# Patient Record
Sex: FEMALE | Race: OTHER | NOT HISPANIC OR LATINO | URBAN - METROPOLITAN AREA
[De-identification: names, ages, dates, MRNs, and addresses within clinical notes are randomized per-mention and may not be internally consistent; named-entity substitution may affect disease eponyms.]

---

## 2018-11-06 VITALS
TEMPERATURE: 98 F | HEIGHT: 62 IN | RESPIRATION RATE: 16 BRPM | SYSTOLIC BLOOD PRESSURE: 165 MMHG | OXYGEN SATURATION: 97 % | DIASTOLIC BLOOD PRESSURE: 92 MMHG | HEART RATE: 68 BPM | WEIGHT: 210.1 LBS

## 2018-11-07 ENCOUNTER — INPATIENT (INPATIENT)
Facility: HOSPITAL | Age: 78
LOS: 2 days | Discharge: NURSING, REHAB OR LONG-TERM CARE | DRG: 470 | End: 2018-11-10
Payer: MEDICARE

## 2018-11-07 DIAGNOSIS — I63.9 CEREBRAL INFARCTION, UNSPECIFIED: ICD-10-CM

## 2018-11-07 DIAGNOSIS — R05 COUGH: ICD-10-CM

## 2018-11-07 DIAGNOSIS — C50.919 MALIGNANT NEOPLASM OF UNSPECIFIED SITE OF UNSPECIFIED FEMALE BREAST: ICD-10-CM

## 2018-11-07 DIAGNOSIS — F41.9 ANXIETY DISORDER, UNSPECIFIED: ICD-10-CM

## 2018-11-07 DIAGNOSIS — E03.9 HYPOTHYROIDISM, UNSPECIFIED: ICD-10-CM

## 2018-11-07 DIAGNOSIS — D49.1 NEOPLASM OF UNSPECIFIED BEHAVIOR OF RESPIRATORY SYSTEM: ICD-10-CM

## 2018-11-07 DIAGNOSIS — Z90.11 ACQUIRED ABSENCE OF RIGHT BREAST AND NIPPLE: ICD-10-CM

## 2018-11-07 DIAGNOSIS — I69.934 MONOPLEGIA OF UPPER LIMB FOLLOWING UNSPECIFIED CEREBROVASCULAR DISEASE AFFECTING LEFT NON-DOMINANT SIDE: ICD-10-CM

## 2018-11-07 DIAGNOSIS — E78.5 HYPERLIPIDEMIA, UNSPECIFIED: ICD-10-CM

## 2018-11-07 DIAGNOSIS — M17.12 UNILATERAL PRIMARY OSTEOARTHRITIS, LEFT KNEE: ICD-10-CM

## 2018-11-07 DIAGNOSIS — Z90.2 ACQUIRED ABSENCE OF LUNG [PART OF]: ICD-10-CM

## 2018-11-07 DIAGNOSIS — I10 ESSENTIAL (PRIMARY) HYPERTENSION: ICD-10-CM

## 2018-11-07 DIAGNOSIS — Z85.118 PERSONAL HISTORY OF OTHER MALIGNANT NEOPLASM OF BRONCHUS AND LUNG: ICD-10-CM

## 2018-11-07 DIAGNOSIS — Z98.890 OTHER SPECIFIED POSTPROCEDURAL STATES: Chronic | ICD-10-CM

## 2018-11-07 DIAGNOSIS — Z87.891 PERSONAL HISTORY OF NICOTINE DEPENDENCE: ICD-10-CM

## 2018-11-07 DIAGNOSIS — R91.8 OTHER NONSPECIFIC ABNORMAL FINDING OF LUNG FIELD: ICD-10-CM

## 2018-11-07 DIAGNOSIS — E66.9 OBESITY, UNSPECIFIED: ICD-10-CM

## 2018-11-07 DIAGNOSIS — Z79.4 LONG TERM (CURRENT) USE OF INSULIN: ICD-10-CM

## 2018-11-07 DIAGNOSIS — D49.1 NEOPLASM OF UNSPECIFIED BEHAVIOR OF RESPIRATORY SYSTEM: Chronic | ICD-10-CM

## 2018-11-07 DIAGNOSIS — Z85.3 PERSONAL HISTORY OF MALIGNANT NEOPLASM OF BREAST: ICD-10-CM

## 2018-11-07 DIAGNOSIS — E11.9 TYPE 2 DIABETES MELLITUS WITHOUT COMPLICATIONS: ICD-10-CM

## 2018-11-07 DIAGNOSIS — Z90.11 ACQUIRED ABSENCE OF RIGHT BREAST AND NIPPLE: Chronic | ICD-10-CM

## 2018-11-07 DIAGNOSIS — F32.9 MAJOR DEPRESSIVE DISORDER, SINGLE EPISODE, UNSPECIFIED: ICD-10-CM

## 2018-11-07 LAB
GLUCOSE BLDC GLUCOMTR-MCNC: 141 MG/DL — HIGH (ref 70–99)
GLUCOSE BLDC GLUCOMTR-MCNC: 144 MG/DL — HIGH (ref 70–99)
GLUCOSE BLDC GLUCOMTR-MCNC: 217 MG/DL — HIGH (ref 70–99)

## 2018-11-07 PROCEDURE — 73560 X-RAY EXAM OF KNEE 1 OR 2: CPT | Mod: 26,LT

## 2018-11-07 RX ORDER — OXYCODONE HYDROCHLORIDE 5 MG/1
5 TABLET ORAL EVERY 4 HOURS
Qty: 0 | Refills: 0 | Status: DISCONTINUED | OUTPATIENT
Start: 2018-11-07 | End: 2018-11-09

## 2018-11-07 RX ORDER — LEVOTHYROXINE SODIUM 125 MCG
75 TABLET ORAL DAILY
Qty: 0 | Refills: 0 | Status: DISCONTINUED | OUTPATIENT
Start: 2018-11-07 | End: 2018-11-10

## 2018-11-07 RX ORDER — DEXTROSE 50 % IN WATER 50 %
15 SYRINGE (ML) INTRAVENOUS ONCE
Qty: 0 | Refills: 0 | Status: DISCONTINUED | OUTPATIENT
Start: 2018-11-07 | End: 2018-11-10

## 2018-11-07 RX ORDER — HYDROMORPHONE HYDROCHLORIDE 2 MG/ML
0.5 INJECTION INTRAMUSCULAR; INTRAVENOUS; SUBCUTANEOUS
Qty: 0 | Refills: 0 | Status: DISCONTINUED | OUTPATIENT
Start: 2018-11-07 | End: 2018-11-07

## 2018-11-07 RX ORDER — INSULIN LISPRO 100/ML
VIAL (ML) SUBCUTANEOUS
Qty: 0 | Refills: 0 | Status: DISCONTINUED | OUTPATIENT
Start: 2018-11-07 | End: 2018-11-10

## 2018-11-07 RX ORDER — GABAPENTIN 400 MG/1
300 CAPSULE ORAL DAILY
Qty: 0 | Refills: 0 | Status: DISCONTINUED | OUTPATIENT
Start: 2018-11-07 | End: 2018-11-10

## 2018-11-07 RX ORDER — DEXTROSE 50 % IN WATER 50 %
25 SYRINGE (ML) INTRAVENOUS ONCE
Qty: 0 | Refills: 0 | Status: DISCONTINUED | OUTPATIENT
Start: 2018-11-07 | End: 2018-11-10

## 2018-11-07 RX ORDER — MIRTAZAPINE 45 MG/1
15 TABLET, ORALLY DISINTEGRATING ORAL DAILY
Qty: 0 | Refills: 0 | Status: DISCONTINUED | OUTPATIENT
Start: 2018-11-07 | End: 2018-11-10

## 2018-11-07 RX ORDER — HYDROMORPHONE HYDROCHLORIDE 2 MG/ML
1 INJECTION INTRAMUSCULAR; INTRAVENOUS; SUBCUTANEOUS EVERY 4 HOURS
Qty: 0 | Refills: 0 | Status: DISCONTINUED | OUTPATIENT
Start: 2018-11-07 | End: 2018-11-07

## 2018-11-07 RX ORDER — ASPIRIN/CALCIUM CARB/MAGNESIUM 324 MG
325 TABLET ORAL DAILY
Qty: 0 | Refills: 0 | Status: DISCONTINUED | OUTPATIENT
Start: 2018-11-07 | End: 2018-11-07

## 2018-11-07 RX ORDER — ONDANSETRON 8 MG/1
4 TABLET, FILM COATED ORAL EVERY 6 HOURS
Qty: 0 | Refills: 0 | Status: DISCONTINUED | OUTPATIENT
Start: 2018-11-07 | End: 2018-11-07

## 2018-11-07 RX ORDER — INSULIN GLARGINE 100 [IU]/ML
6 INJECTION, SOLUTION SUBCUTANEOUS AT BEDTIME
Qty: 0 | Refills: 0 | Status: DISCONTINUED | OUTPATIENT
Start: 2018-11-07 | End: 2018-11-10

## 2018-11-07 RX ORDER — HYDROMORPHONE HYDROCHLORIDE 2 MG/ML
0.5 INJECTION INTRAMUSCULAR; INTRAVENOUS; SUBCUTANEOUS EVERY 4 HOURS
Qty: 0 | Refills: 0 | Status: DISCONTINUED | OUTPATIENT
Start: 2018-11-07 | End: 2018-11-10

## 2018-11-07 RX ORDER — INSULIN LISPRO 100/ML
VIAL (ML) SUBCUTANEOUS AT BEDTIME
Qty: 0 | Refills: 0 | Status: DISCONTINUED | OUTPATIENT
Start: 2018-11-07 | End: 2018-11-10

## 2018-11-07 RX ORDER — ATORVASTATIN CALCIUM 80 MG/1
20 TABLET, FILM COATED ORAL AT BEDTIME
Qty: 0 | Refills: 0 | Status: DISCONTINUED | OUTPATIENT
Start: 2018-11-07 | End: 2018-11-10

## 2018-11-07 RX ORDER — ONDANSETRON 8 MG/1
4 TABLET, FILM COATED ORAL EVERY 6 HOURS
Qty: 0 | Refills: 0 | Status: DISCONTINUED | OUTPATIENT
Start: 2018-11-07 | End: 2018-11-10

## 2018-11-07 RX ORDER — SODIUM CHLORIDE 9 MG/ML
1000 INJECTION, SOLUTION INTRAVENOUS
Qty: 0 | Refills: 0 | Status: DISCONTINUED | OUTPATIENT
Start: 2018-11-07 | End: 2018-11-10

## 2018-11-07 RX ORDER — CEFAZOLIN SODIUM 1 G
2000 VIAL (EA) INJECTION EVERY 8 HOURS
Qty: 0 | Refills: 0 | Status: COMPLETED | OUTPATIENT
Start: 2018-11-07 | End: 2018-11-08

## 2018-11-07 RX ORDER — ASPIRIN/CALCIUM CARB/MAGNESIUM 324 MG
325 TABLET ORAL DAILY
Qty: 0 | Refills: 0 | Status: DISCONTINUED | OUTPATIENT
Start: 2018-11-07 | End: 2018-11-08

## 2018-11-07 RX ORDER — OXYCODONE AND ACETAMINOPHEN 5; 325 MG/1; MG/1
2 TABLET ORAL EVERY 4 HOURS
Qty: 0 | Refills: 0 | Status: DISCONTINUED | OUTPATIENT
Start: 2018-11-07 | End: 2018-11-07

## 2018-11-07 RX ORDER — MONTELUKAST 4 MG/1
10 TABLET, CHEWABLE ORAL DAILY
Qty: 0 | Refills: 0 | Status: DISCONTINUED | OUTPATIENT
Start: 2018-11-07 | End: 2018-11-10

## 2018-11-07 RX ORDER — OXYCODONE HYDROCHLORIDE 5 MG/1
10 TABLET ORAL EVERY 4 HOURS
Qty: 0 | Refills: 0 | Status: DISCONTINUED | OUTPATIENT
Start: 2018-11-07 | End: 2018-11-09

## 2018-11-07 RX ORDER — HYDROMORPHONE HYDROCHLORIDE 2 MG/ML
0.5 INJECTION INTRAMUSCULAR; INTRAVENOUS; SUBCUTANEOUS ONCE
Qty: 0 | Refills: 0 | Status: DISCONTINUED | OUTPATIENT
Start: 2018-11-07 | End: 2018-11-07

## 2018-11-07 RX ORDER — DEXTROSE 50 % IN WATER 50 %
12.5 SYRINGE (ML) INTRAVENOUS ONCE
Qty: 0 | Refills: 0 | Status: DISCONTINUED | OUTPATIENT
Start: 2018-11-07 | End: 2018-11-10

## 2018-11-07 RX ORDER — INSULIN LISPRO 100/ML
2 VIAL (ML) SUBCUTANEOUS
Qty: 0 | Refills: 0 | Status: DISCONTINUED | OUTPATIENT
Start: 2018-11-07 | End: 2018-11-10

## 2018-11-07 RX ORDER — GLUCAGON INJECTION, SOLUTION 0.5 MG/.1ML
1 INJECTION, SOLUTION SUBCUTANEOUS ONCE
Qty: 0 | Refills: 0 | Status: DISCONTINUED | OUTPATIENT
Start: 2018-11-07 | End: 2018-11-10

## 2018-11-07 RX ORDER — OXYCODONE AND ACETAMINOPHEN 5; 325 MG/1; MG/1
1 TABLET ORAL EVERY 4 HOURS
Qty: 0 | Refills: 0 | Status: DISCONTINUED | OUTPATIENT
Start: 2018-11-07 | End: 2018-11-07

## 2018-11-07 RX ORDER — ACETAMINOPHEN 500 MG
650 TABLET ORAL EVERY 6 HOURS
Qty: 0 | Refills: 0 | Status: DISCONTINUED | OUTPATIENT
Start: 2018-11-07 | End: 2018-11-08

## 2018-11-07 RX ORDER — BUPIVACAINE 13.3 MG/ML
20 INJECTION, SUSPENSION, LIPOSOMAL INFILTRATION ONCE
Qty: 0 | Refills: 0 | Status: DISCONTINUED | OUTPATIENT
Start: 2018-11-07 | End: 2018-11-10

## 2018-11-07 RX ORDER — LOSARTAN POTASSIUM 100 MG/1
50 TABLET, FILM COATED ORAL DAILY
Qty: 0 | Refills: 0 | Status: DISCONTINUED | OUTPATIENT
Start: 2018-11-07 | End: 2018-11-08

## 2018-11-07 RX ADMIN — HYDROMORPHONE HYDROCHLORIDE 0.5 MILLIGRAM(S): 2 INJECTION INTRAMUSCULAR; INTRAVENOUS; SUBCUTANEOUS at 15:57

## 2018-11-07 RX ADMIN — HYDROMORPHONE HYDROCHLORIDE 0.5 MILLIGRAM(S): 2 INJECTION INTRAMUSCULAR; INTRAVENOUS; SUBCUTANEOUS at 16:40

## 2018-11-07 RX ADMIN — HYDROMORPHONE HYDROCHLORIDE 0.5 MILLIGRAM(S): 2 INJECTION INTRAMUSCULAR; INTRAVENOUS; SUBCUTANEOUS at 15:28

## 2018-11-07 RX ADMIN — Medication 100 MILLIGRAM(S): at 18:02

## 2018-11-07 RX ADMIN — INSULIN GLARGINE 6 UNIT(S): 100 INJECTION, SOLUTION SUBCUTANEOUS at 22:20

## 2018-11-07 RX ADMIN — ATORVASTATIN CALCIUM 20 MILLIGRAM(S): 80 TABLET, FILM COATED ORAL at 22:03

## 2018-11-07 NOTE — PHYSICAL THERAPY INITIAL EVALUATION ADULT - GAIT DEVIATIONS NOTED, PT EVAL
decreased gait speed, unsteady, difficulty weight-shifting/decreased stride length/decreased step length

## 2018-11-07 NOTE — H&P ADULT - HISTORY OF PRESENT ILLNESS
77F c/o left knee pain x > 1 year. Pt denies preceding trauma/injury. Pt states her left knee pain is localized; she takes mobic as needed for pain however has failed conservative treatment for her symptoms. Pt denies numbness/tingling of bilateral lower extremities; endorses left lower extremity weakness secondary to knee instability. Pt denies DVT hx; takes ASA 81 which she d/c 1 week ago preoperatively. Denies CP, SOB, N/V, tactile fevers today.

## 2018-11-07 NOTE — PROGRESS NOTE ADULT - SUBJECTIVE AND OBJECTIVE BOX
Orthopaedics Post Op Check    Procedure: L TKR  Surgeon: Lars Sandoval comfortable, without complaints  Denies CP, SOB, N/V, numbness/tingling     Vital Signs Last 24 Hrs  T(C): 36.9 (07 Nov 2018 22:00), Max: 36.9 (07 Nov 2018 22:00)  T(F): 98.5 (07 Nov 2018 22:00), Max: 98.5 (07 Nov 2018 22:00)  HR: 84 (07 Nov 2018 22:00) (58 - 86)  BP: 142/66 (07 Nov 2018 22:00) (132/58 - 178/71)  BP(mean): 99 (07 Nov 2018 16:35) (87 - 107)  RR: 17 (07 Nov 2018 22:00) (11 - 18)  SpO2: 95% (07 Nov 2018 22:00) (94% - 100%)      AVSS, NAD  General: Pt Alert and oriented       LLE  Dressing: C/D/I  Motor: 5/5 GS/TA/EHL/FHL    Sensation: SILT s/sp/dp/tib  Pulses:  DP/PT 2+ , toes/foot WWP          Post op XR: Well positioned components    A/P: 77yFemale POD#0 s/p above procedure, doing well  - Stable  - Pain Control  - DVT ppx:  BID / SCDs  - Melisa op abx: ancef  - PT, WBS: WBAT  - F/U AM Labs

## 2018-11-07 NOTE — H&P ADULT - PMH
Anxiety and depression    Breast cancer    DM (diabetes mellitus)    HLD (hyperlipidemia)    HTN (hypertension)    Hypothyroidism    Lung tumor    Stroke

## 2018-11-07 NOTE — H&P ADULT - NSHPLABSRESULTS_GEN_ALL_CORE
Preop CBC, BMP, PT/PTT/INR, UA - WNL per medical clearance  Nasal swab negative  Preop EKG - WNL per medical clearance  Preop CXR - WNL per medical clearance

## 2018-11-07 NOTE — PHYSICAL THERAPY INITIAL EVALUATION ADULT - PERTINENT HX OF CURRENT PROBLEM, REHAB EVAL
77F c/o left knee pain x > 1 year. Pt denies preceding trauma/injury. Pt states her left knee pain is localized; she takes mobic as needed for pain however has failed conservative treatment for her symptoms. Pt denies numbness/tingling of bilateral lower extremities; endorses left lower extremity weakness secondary to knee instability.

## 2018-11-07 NOTE — PHYSICAL THERAPY INITIAL EVALUATION ADULT - ADDITIONAL COMMENTS
Patient lives in house, 5 steps to enter, chair lift for stairs on inside of home. Patient reports home health assistance 5 days a week for 2 hours. Patient is R handed, has no use of LUE secondary to prior CVA. Patient denies history of falls. Patient owns grab bars bathroom and shower chair.

## 2018-11-07 NOTE — H&P ADULT - PROBLEM SELECTOR PLAN 1
Admit to Orthopaedic Service.  Presents today for elective left TKR   Pt medically stable and cleared for procedure today by Dr. Mahoney

## 2018-11-07 NOTE — H&P ADULT - NSHPPHYSICALEXAM_GEN_ALL_CORE
MSK: + decreased ROM secondary to pain, left knee  Skin warm and well perfused; no visible wounds/erythema/ecchymoses  EHL/FHL/TA/GS 5/5 motor strength bilaterally   SLT in tact and equal to distal bilateral lower extremities  DP/PT pulses 2+     Remainder of exam per medical clearance note

## 2018-11-07 NOTE — PHYSICAL THERAPY INITIAL EVALUATION ADULT - ACTIVE RANGE OF MOTION EXAMINATION, REHAB EVAL
bilateral upper extremity Active ROM was WFL (within functional limits)/bilateral  lower extremity Active ROM was WFL (within functional limits) bilateral  lower extremity Active ROM was WFL (within functional limits)/deficits as listed below/except at L knee flexion/extension/bilateral upper extremity Active ROM was WFL (within functional limits)

## 2018-11-08 LAB
ANION GAP SERPL CALC-SCNC: 15 MMOL/L — SIGNIFICANT CHANGE UP (ref 5–17)
BASOPHILS NFR BLD AUTO: 0.2 % — SIGNIFICANT CHANGE UP (ref 0–2)
BUN SERPL-MCNC: 20 MG/DL — SIGNIFICANT CHANGE UP (ref 7–23)
CALCIUM SERPL-MCNC: 9.6 MG/DL — SIGNIFICANT CHANGE UP (ref 8.4–10.5)
CHLORIDE SERPL-SCNC: 96 MMOL/L — SIGNIFICANT CHANGE UP (ref 96–108)
CO2 SERPL-SCNC: 26 MMOL/L — SIGNIFICANT CHANGE UP (ref 22–31)
CREAT SERPL-MCNC: 0.82 MG/DL — SIGNIFICANT CHANGE UP (ref 0.5–1.3)
GLUCOSE BLDC GLUCOMTR-MCNC: 161 MG/DL — HIGH (ref 70–99)
GLUCOSE BLDC GLUCOMTR-MCNC: 164 MG/DL — HIGH (ref 70–99)
GLUCOSE BLDC GLUCOMTR-MCNC: 234 MG/DL — HIGH (ref 70–99)
GLUCOSE BLDC GLUCOMTR-MCNC: 270 MG/DL — HIGH (ref 70–99)
GLUCOSE SERPL-MCNC: 254 MG/DL — HIGH (ref 70–99)
HCT VFR BLD CALC: 30.4 % — LOW (ref 34.5–45)
HGB BLD-MCNC: 9.5 G/DL — LOW (ref 11.5–15.5)
LYMPHOCYTES # BLD AUTO: 4.7 % — LOW (ref 13–44)
MCHC RBC-ENTMCNC: 28.8 PG — SIGNIFICANT CHANGE UP (ref 27–34)
MCHC RBC-ENTMCNC: 31.3 G/DL — LOW (ref 32–36)
MCV RBC AUTO: 92.1 FL — SIGNIFICANT CHANGE UP (ref 80–100)
MONOCYTES NFR BLD AUTO: 14.1 % — HIGH (ref 2–14)
NEUTROPHILS NFR BLD AUTO: 81 % — HIGH (ref 43–77)
PLATELET # BLD AUTO: 188 K/UL — SIGNIFICANT CHANGE UP (ref 150–400)
POTASSIUM SERPL-MCNC: 4.2 MMOL/L — SIGNIFICANT CHANGE UP (ref 3.5–5.3)
POTASSIUM SERPL-SCNC: 4.2 MMOL/L — SIGNIFICANT CHANGE UP (ref 3.5–5.3)
RBC # BLD: 3.3 M/UL — LOW (ref 3.8–5.2)
RBC # FLD: 14.5 % — SIGNIFICANT CHANGE UP (ref 10.3–16.9)
SODIUM SERPL-SCNC: 137 MMOL/L — SIGNIFICANT CHANGE UP (ref 135–145)
SURGICAL PATHOLOGY STUDY: SIGNIFICANT CHANGE UP
WBC # BLD: 16.9 K/UL — HIGH (ref 3.8–10.5)
WBC # FLD AUTO: 16.9 K/UL — HIGH (ref 3.8–10.5)

## 2018-11-08 RX ORDER — SENNA PLUS 8.6 MG/1
2 TABLET ORAL AT BEDTIME
Qty: 0 | Refills: 0 | Status: DISCONTINUED | OUTPATIENT
Start: 2018-11-08 | End: 2018-11-10

## 2018-11-08 RX ORDER — HALOPERIDOL DECANOATE 100 MG/ML
0.5 INJECTION INTRAMUSCULAR EVERY 8 HOURS
Qty: 0 | Refills: 0 | Status: DISCONTINUED | OUTPATIENT
Start: 2018-11-08 | End: 2018-11-10

## 2018-11-08 RX ORDER — DOCUSATE SODIUM 100 MG
100 CAPSULE ORAL DAILY
Qty: 0 | Refills: 0 | Status: DISCONTINUED | OUTPATIENT
Start: 2018-11-08 | End: 2018-11-10

## 2018-11-08 RX ORDER — LOSARTAN POTASSIUM 100 MG/1
50 TABLET, FILM COATED ORAL
Qty: 0 | Refills: 0 | Status: DISCONTINUED | OUTPATIENT
Start: 2018-11-08 | End: 2018-11-10

## 2018-11-08 RX ORDER — ACETAMINOPHEN 500 MG
650 TABLET ORAL EVERY 6 HOURS
Qty: 0 | Refills: 0 | Status: DISCONTINUED | OUTPATIENT
Start: 2018-11-08 | End: 2018-11-10

## 2018-11-08 RX ORDER — ASPIRIN/CALCIUM CARB/MAGNESIUM 324 MG
325 TABLET ORAL
Qty: 0 | Refills: 0 | Status: DISCONTINUED | OUTPATIENT
Start: 2018-11-08 | End: 2018-11-10

## 2018-11-08 RX ADMIN — LOSARTAN POTASSIUM 50 MILLIGRAM(S): 100 TABLET, FILM COATED ORAL at 22:52

## 2018-11-08 RX ADMIN — INSULIN GLARGINE 6 UNIT(S): 100 INJECTION, SOLUTION SUBCUTANEOUS at 22:45

## 2018-11-08 RX ADMIN — Medication 2 UNIT(S): at 16:57

## 2018-11-08 RX ADMIN — Medication 650 MILLIGRAM(S): at 17:00

## 2018-11-08 RX ADMIN — Medication 4: at 07:58

## 2018-11-08 RX ADMIN — Medication 2 UNIT(S): at 12:01

## 2018-11-08 RX ADMIN — HYDROMORPHONE HYDROCHLORIDE 0.5 MILLIGRAM(S): 2 INJECTION INTRAMUSCULAR; INTRAVENOUS; SUBCUTANEOUS at 06:00

## 2018-11-08 RX ADMIN — Medication 325 MILLIGRAM(S): at 12:01

## 2018-11-08 RX ADMIN — Medication 2: at 16:57

## 2018-11-08 RX ADMIN — Medication 325 MILLIGRAM(S): at 22:44

## 2018-11-08 RX ADMIN — MONTELUKAST 10 MILLIGRAM(S): 4 TABLET, CHEWABLE ORAL at 22:52

## 2018-11-08 RX ADMIN — ATORVASTATIN CALCIUM 20 MILLIGRAM(S): 80 TABLET, FILM COATED ORAL at 22:44

## 2018-11-08 RX ADMIN — HYDROMORPHONE HYDROCHLORIDE 0.5 MILLIGRAM(S): 2 INJECTION INTRAMUSCULAR; INTRAVENOUS; SUBCUTANEOUS at 05:45

## 2018-11-08 RX ADMIN — Medication 650 MILLIGRAM(S): at 12:02

## 2018-11-08 RX ADMIN — Medication 75 MICROGRAM(S): at 05:53

## 2018-11-08 RX ADMIN — Medication 6: at 12:01

## 2018-11-08 RX ADMIN — Medication 100 MILLIGRAM(S): at 02:18

## 2018-11-08 RX ADMIN — Medication 2 UNIT(S): at 07:58

## 2018-11-08 RX ADMIN — GABAPENTIN 300 MILLIGRAM(S): 400 CAPSULE ORAL at 22:44

## 2018-11-08 RX ADMIN — LOSARTAN POTASSIUM 50 MILLIGRAM(S): 100 TABLET, FILM COATED ORAL at 05:53

## 2018-11-08 RX ADMIN — OXYCODONE HYDROCHLORIDE 10 MILLIGRAM(S): 5 TABLET ORAL at 07:00

## 2018-11-08 RX ADMIN — Medication 650 MILLIGRAM(S): at 13:03

## 2018-11-08 RX ADMIN — MIRTAZAPINE 15 MILLIGRAM(S): 45 TABLET, ORALLY DISINTEGRATING ORAL at 12:01

## 2018-11-08 RX ADMIN — Medication 650 MILLIGRAM(S): at 23:53

## 2018-11-08 RX ADMIN — OXYCODONE HYDROCHLORIDE 10 MILLIGRAM(S): 5 TABLET ORAL at 06:09

## 2018-11-08 RX ADMIN — Medication 650 MILLIGRAM(S): at 17:58

## 2018-11-08 RX ADMIN — Medication 650 MILLIGRAM(S): at 23:00

## 2018-11-08 NOTE — PROGRESS NOTE ADULT - SUBJECTIVE AND OBJECTIVE BOX
Orthopaedic Surgery Progress Note     [x] Pt seen/examined.  [x] Pt without any complaints/in NAD. No acute events overnight. This morning nursing states patient was confused; A&O x 2 (self and time) which improved to A&Ox3. Pt evaluated, mildly agitated/pulling at gown however responsive to team/instruction. Per nursing patient had been giving narcotics approximately one hour prior. Narcotics held, pt evaluated by Dr. Dave who recommended haldol PRN. Pt denies CP, SOB, N/V, tactile fevers, calf pain, headache. Patient is afebrile this morning. Of note patient has hx of stroke in 2015 with residual left upper extremity weakness.     Vital Signs Last 24 Hrs  T(C): 36.7 (08 Nov 2018 08:50), Max: 37.3 (08 Nov 2018 02:10)  T(F): 98 (08 Nov 2018 08:50), Max: 99.1 (08 Nov 2018 02:10)  HR: 98 (08 Nov 2018 08:50) (58 - 98)  BP: 176/74 (08 Nov 2018 08:50) (142/66 - 178/71)  BP(mean): 99 (07 Nov 2018 16:35) (92 - 107)  RR: 16 (08 Nov 2018 08:50) (11 - 18)  SpO2: 98% (08 Nov 2018 08:50) (94% - 100%)    CAPILLARY BLOOD GLUCOSE      POCT Blood Glucose.: 270 mg/dL (08 Nov 2018 11:33)  POCT Blood Glucose.: 234 mg/dL (08 Nov 2018 07:07)  POCT Blood Glucose.: 217 mg/dL (07 Nov 2018 21:38)  POCT Blood Glucose.: 141 mg/dL (07 Nov 2018 17:53)                Physical Exam:  Patient laying in bed, NAD - mild agitation but cooperative  Skin warm and well perfused; no visible wounds/erythema/ecchymoses  Dressing C/D/I  EHL/FHL/TA/GS 5/5 motor strength bilateral lower extremities  SLT in tact and equal to distal bilateral lower extremities  DP/PT pulses 2+    LABS                        9.5    16.9  )-----------( 188      ( 08 Nov 2018 06:00 )             30.4                                11-08    137  |  96  |  20  ----------------------------<  254<H>  4.2   |  26  |  0.82    Ca    9.6      08 Nov 2018 06:00      ASSESSMENT/PLAN:  77F POD #1    STATUS POST: left total knee replacement    CONTINUE:          [x] PT - WBAT    [x] DVT PPX-  bid    [x] Pain Mgt - Narcotics held, standing Tylenol; Haldol PRN for agitation    [x] OT consult ordered (chronic LUE weakness)     [x] Dispo plan- pending PT eval

## 2018-11-08 NOTE — PROGRESS NOTE ADULT - SUBJECTIVE AND OBJECTIVE BOX
SUBJECTIVE: Patient seen and examined. Pain controlled.  Pt did well o/n  No f/c/n/v/cp/sob.     OBJECTIVE:  NAD  Vital Signs Last 24 Hrs  T(C): 36.9 (08 Nov 2018 05:40), Max: 37.3 (08 Nov 2018 02:10)  T(F): 98.5 (08 Nov 2018 05:40), Max: 99.1 (08 Nov 2018 02:10)  HR: 92 (08 Nov 2018 05:40) (58 - 98)  BP: 157/75 (08 Nov 2018 05:40) (132/58 - 178/71)  BP(mean): 99 (07 Nov 2018 16:35) (87 - 107)  RR: 18 (08 Nov 2018 05:40) (11 - 18)  SpO2: 95% (08 Nov 2018 05:40) (94% - 100%)    Affected extremity:          Dressing: clean/dry/intact, ACE bandage in place. Cryocuff           Sensation: SILT saph/sural/sp/dp/t         Motor exam: 5/5 TA/GS/EHL         warm well perfused; capillary refill <3 seconds, DP2+                        9.5    16.9  )-----------( 188      ( 08 Nov 2018 06:00 )             30.4     11-08    137  |  96  |  20  ----------------------------<  254<H>  4.2   |  26  |  0.82    Ca    9.6      08 Nov 2018 06:00      A/P :  Pt is a 76yo Female s/p  L TKA  -    Pain control  -    DVT ppx: ASA  -    Weight bearing status: WBAT   -    Physical Therapy  -    Dispo: pending

## 2018-11-09 DIAGNOSIS — R53.83 OTHER FATIGUE: ICD-10-CM

## 2018-11-09 DIAGNOSIS — Z98.890 OTHER SPECIFIED POSTPROCEDURAL STATES: ICD-10-CM

## 2018-11-09 LAB
GLUCOSE BLDC GLUCOMTR-MCNC: 145 MG/DL — HIGH (ref 70–99)
GLUCOSE BLDC GLUCOMTR-MCNC: 160 MG/DL — HIGH (ref 70–99)
GLUCOSE BLDC GLUCOMTR-MCNC: 173 MG/DL — HIGH (ref 70–99)
GLUCOSE BLDC GLUCOMTR-MCNC: 176 MG/DL — HIGH (ref 70–99)
GLUCOSE BLDC GLUCOMTR-MCNC: 231 MG/DL — HIGH (ref 70–99)

## 2018-11-09 PROCEDURE — 99232 SBSQ HOSP IP/OBS MODERATE 35: CPT | Mod: GC

## 2018-11-09 RX ORDER — CELECOXIB 200 MG/1
1 CAPSULE ORAL
Qty: 0 | Refills: 0 | COMMUNITY
Start: 2018-11-09

## 2018-11-09 RX ORDER — ASPIRIN/CALCIUM CARB/MAGNESIUM 324 MG
1 TABLET ORAL
Qty: 0 | Refills: 0 | COMMUNITY
Start: 2018-11-09

## 2018-11-09 RX ORDER — SENNA PLUS 8.6 MG/1
2 TABLET ORAL
Qty: 0 | Refills: 0 | COMMUNITY
Start: 2018-11-09

## 2018-11-09 RX ORDER — ACETAMINOPHEN 500 MG
2 TABLET ORAL
Qty: 0 | Refills: 0 | COMMUNITY
Start: 2018-11-09

## 2018-11-09 RX ORDER — CELECOXIB 200 MG/1
200 CAPSULE ORAL
Qty: 0 | Refills: 0 | Status: DISCONTINUED | OUTPATIENT
Start: 2018-11-09 | End: 2018-11-10

## 2018-11-09 RX ORDER — ASPIRIN/CALCIUM CARB/MAGNESIUM 324 MG
1 TABLET ORAL
Qty: 0 | Refills: 0 | COMMUNITY

## 2018-11-09 RX ORDER — DOCUSATE SODIUM 100 MG
1 CAPSULE ORAL
Qty: 0 | Refills: 0 | COMMUNITY
Start: 2018-11-09

## 2018-11-09 RX ORDER — OXYCODONE HYDROCHLORIDE 5 MG/1
1 TABLET ORAL
Qty: 0 | Refills: 0 | COMMUNITY
Start: 2018-11-09

## 2018-11-09 RX ADMIN — GABAPENTIN 300 MILLIGRAM(S): 400 CAPSULE ORAL at 22:34

## 2018-11-09 RX ADMIN — LOSARTAN POTASSIUM 50 MILLIGRAM(S): 100 TABLET, FILM COATED ORAL at 17:24

## 2018-11-09 RX ADMIN — Medication 2: at 06:51

## 2018-11-09 RX ADMIN — MONTELUKAST 10 MILLIGRAM(S): 4 TABLET, CHEWABLE ORAL at 22:35

## 2018-11-09 RX ADMIN — Medication 2 UNIT(S): at 13:02

## 2018-11-09 RX ADMIN — Medication 650 MILLIGRAM(S): at 13:30

## 2018-11-09 RX ADMIN — MIRTAZAPINE 15 MILLIGRAM(S): 45 TABLET, ORALLY DISINTEGRATING ORAL at 13:04

## 2018-11-09 RX ADMIN — Medication 650 MILLIGRAM(S): at 17:24

## 2018-11-09 RX ADMIN — LOSARTAN POTASSIUM 50 MILLIGRAM(S): 100 TABLET, FILM COATED ORAL at 06:51

## 2018-11-09 RX ADMIN — Medication 100 MILLIGRAM(S): at 13:02

## 2018-11-09 RX ADMIN — Medication 650 MILLIGRAM(S): at 17:41

## 2018-11-09 RX ADMIN — Medication 4: at 17:10

## 2018-11-09 RX ADMIN — Medication 650 MILLIGRAM(S): at 05:06

## 2018-11-09 RX ADMIN — CELECOXIB 200 MILLIGRAM(S): 200 CAPSULE ORAL at 17:24

## 2018-11-09 RX ADMIN — Medication 75 MICROGRAM(S): at 06:51

## 2018-11-09 RX ADMIN — Medication 650 MILLIGRAM(S): at 23:30

## 2018-11-09 RX ADMIN — Medication 325 MILLIGRAM(S): at 22:34

## 2018-11-09 RX ADMIN — Medication 650 MILLIGRAM(S): at 05:00

## 2018-11-09 RX ADMIN — Medication 650 MILLIGRAM(S): at 22:34

## 2018-11-09 RX ADMIN — ATORVASTATIN CALCIUM 20 MILLIGRAM(S): 80 TABLET, FILM COATED ORAL at 22:34

## 2018-11-09 RX ADMIN — Medication 650 MILLIGRAM(S): at 13:01

## 2018-11-09 RX ADMIN — INSULIN GLARGINE 6 UNIT(S): 100 INJECTION, SOLUTION SUBCUTANEOUS at 22:33

## 2018-11-09 RX ADMIN — Medication 325 MILLIGRAM(S): at 13:05

## 2018-11-09 RX ADMIN — Medication 2 UNIT(S): at 17:10

## 2018-11-09 RX ADMIN — Medication 2 UNIT(S): at 06:51

## 2018-11-09 RX ADMIN — CELECOXIB 200 MILLIGRAM(S): 200 CAPSULE ORAL at 17:41

## 2018-11-09 NOTE — PROGRESS NOTE ADULT - PROBLEM SELECTOR PLAN 1
most likely related to narcotics.   The mental statis improved and she is answering questions appropriately

## 2018-11-09 NOTE — DISCHARGE NOTE ADULT - CARE PLAN
Principal Discharge DX:	Primary osteoarthritis of left knee  Goal:	improvement s/p Left TKR 11/7/18  Assessment and plan of treatment:	No strenuous activity, heavy lifting, driving or returning to work until cleared by MD.  You may shower - dressing is water-resistant, no soaking in bathtubs.  Dressing to be removed after post op day 5-7, then can leave incision open to air. Keep incision clean and dry.  Any staples/sutures should be removed in 10-14 days.  Try to have regular bowel movements, take stool softener or laxative if necessary.  May take Pepcid or Zantac for upset stomach.  May take Aleve or Naproxen.  Swelling may travel all the way down leg to foot, this is normal and will subside in a few weeks.  Call to schedule an appt with Dr. Sousa for follow up after discharge, usually 2-3 weeks postop.  Contact doctor if you experience: fever greater than 101.5, chills, chest pain, difficulty breathing, redness or excessive drainage around the incision, other concerns.  Follow up with primary care provider. Principal Discharge DX:	Primary osteoarthritis of left knee  Goal:	improvement s/p Left TKR 11/7/18  Assessment and plan of treatment:	No strenuous activity, heavy lifting, driving or returning to work until cleared by MD.  You may shower - dressing is water-resistant, no soaking in bathtubs.  Dressing to be removed after post op day 5-7, then can leave incision open to air. Keep incision clean and dry.  Any staples/sutures should be removed in 10-14 days.  Try to have regular bowel movements, take stool softener or laxative if necessary.  May take Pepcid or Zantac for upset stomach.  May take Aleve or Naproxen instead of celebrex.  Swelling may travel all the way down leg to foot, this is normal and will subside in a few weeks.  Call to schedule an appt with Dr. Sousa for follow up after discharge, usually 2-3 weeks postop.  Contact doctor if you experience: fever greater than 101.5, chills, chest pain, difficulty breathing, redness or excessive drainage around the incision, other concerns.  Follow up with primary care provider.

## 2018-11-09 NOTE — DISCHARGE NOTE ADULT - PATIENT PORTAL LINK FT
You can access the [facility] Patient Portal, offered by [facility], by registering with the following website: [email or web address]

## 2018-11-09 NOTE — DISCHARGE NOTE ADULT - MEDICATION SUMMARY - MEDICATIONS TO TAKE
I will START or STAY ON the medications listed below when I get home from the hospital:    aspirin 325 mg oral delayed release tablet  -- 1 tab(s) by mouth 2 times a day for 4 weeks postop  -- Indication: For Dvt ppx    oxyCODONE 5 mg oral tablet  -- 1 tab(s) by mouth every 4 hours, As needed, Moderate Pain (4 - 6)  -- Indication: For Pain    oxyCODONE 10 mg oral tablet  -- 1 tab(s) by mouth every 4 hours, As needed, Severe Pain (7 - 10)  -- Indication: For Pain    losartan 50 mg oral tablet  -- 1 tab(s) by mouth 2 times a day  -- Indication: For HTN (hypertension)    gabapentin enacarbil 300 mg oral tablet, extended release  -- orally once a day  -- Indication: For nerve pain    Remeron  -- Indication: For Psych med    Lantus Solostar Pen 100 units/mL subcutaneous solution  -- 12 unit(s) subcutaneous once a day (at bedtime)  -- Indication: For DM (diabetes mellitus)    NovoLOG 100 units/mL subcutaneous solution  -- 4 unit(s) subcutaneous 4 times a day  -- Indication: For DM (diabetes mellitus)    metFORMIN 750 mg oral tablet, extended release  -- 1 tab(s) by mouth 2 times a day  -- Indication: For DM (diabetes mellitus)    atorvastatin 20 mg oral tablet  -- 1 tab(s) by mouth once a day  -- Indication: For Hyperlipidemia    docusate sodium 100 mg oral capsule  -- 1 cap(s) by mouth once a day  -- Indication: For constipation    senna oral tablet  -- 2 tab(s) by mouth once a day (at bedtime), As needed, Constipation  -- Indication: For constipation    Singulair 10 mg oral tablet  -- 1 tab(s) by mouth once a day  -- Indication: For Asthma    Synthroid 75 mcg (0.075 mg) oral tablet  -- 1 tab(s) by mouth once a day  -- Indication: For Hypothyroidism I will START or STAY ON the medications listed below when I get home from the hospital:    aspirin 325 mg oral delayed release tablet  -- 1 tab(s) by mouth 2 times a day for 4 weeks postop  -- Indication: For Dvt ppx    Tylenol 325 mg oral tablet  -- 2 tab(s) by mouth every 6 hours, As Needed  -- Indication: For Pain    CeleBREX 200 mg oral capsule  -- 1 cap(s) by mouth 2 times a day  -- Indication: For Anti-inflammatory/pain    losartan 50 mg oral tablet  -- 1 tab(s) by mouth 2 times a day  -- Indication: For HTN (hypertension)    gabapentin enacarbil 300 mg oral tablet, extended release  -- orally once a day  -- Indication: For nerve pain    Remeron  -- Indication: For Psych med    Lantus Solostar Pen 100 units/mL subcutaneous solution  -- 12 unit(s) subcutaneous once a day (at bedtime)  -- Indication: For DM (diabetes mellitus)    NovoLOG 100 units/mL subcutaneous solution  -- 4 unit(s) subcutaneous 4 times a day  -- Indication: For DM (diabetes mellitus)    metFORMIN 750 mg oral tablet, extended release  -- 1 tab(s) by mouth 2 times a day  -- Indication: For DM (diabetes mellitus)    atorvastatin 20 mg oral tablet  -- 1 tab(s) by mouth once a day  -- Indication: For Hyperlipidemia    docusate sodium 100 mg oral capsule  -- 1 cap(s) by mouth once a day  -- Indication: For constipation    senna oral tablet  -- 2 tab(s) by mouth once a day (at bedtime), As needed, Constipation  -- Indication: For constipation    Singulair 10 mg oral tablet  -- 1 tab(s) by mouth once a day  -- Indication: For Asthma    Synthroid 75 mcg (0.075 mg) oral tablet  -- 1 tab(s) by mouth once a day  -- Indication: For Hypothyroidism

## 2018-11-09 NOTE — PROGRESS NOTE ADULT - ATTENDING COMMENTS
Patient seen and examined with house-staff during bedside rounds.  Resident note read, including vitals, physical findings, laboratory data, and radiological reports.   Revisions included below.  Direct personal management at bed side and extensive interpretation of the data.  Plan was outlined and discussed in details with the housestaff.  Decision making of high complexity  Action taken for acute disease activity to reflect the level of care provided:  - medication reconciliation  - review laboratory data  haldol as needed

## 2018-11-09 NOTE — DISCHARGE NOTE ADULT - CARE PROVIDER_API CALL
Holger Sousa), Orthopaedic Surgery  [address]  [address] [place] [address]  Phone: [phone]  Fax: [phone]

## 2018-11-09 NOTE — DISCHARGE NOTE ADULT - PLAN OF CARE
improvement s/p Left TKR 11/7/18 No strenuous activity, heavy lifting, driving or returning to work until cleared by MD.  You may shower - dressing is water-resistant, no soaking in bathtubs.  Dressing to be removed after post op day 5-7, then can leave incision open to air. Keep incision clean and dry.  Any staples/sutures should be removed in 10-14 days.  Try to have regular bowel movements, take stool softener or laxative if necessary.  May take Pepcid or Zantac for upset stomach.  May take Aleve or Naproxen.  Swelling may travel all the way down leg to foot, this is normal and will subside in a few weeks.  Call to schedule an appt with Dr. Sousa for follow up after discharge, usually 2-3 weeks postop.  Contact doctor if you experience: fever greater than 101.5, chills, chest pain, difficulty breathing, redness or excessive drainage around the incision, other concerns.  Follow up with primary care provider. No strenuous activity, heavy lifting, driving or returning to work until cleared by MD.  You may shower - dressing is water-resistant, no soaking in bathtubs.  Dressing to be removed after post op day 5-7, then can leave incision open to air. Keep incision clean and dry.  Any staples/sutures should be removed in 10-14 days.  Try to have regular bowel movements, take stool softener or laxative if necessary.  May take Pepcid or Zantac for upset stomach.  May take Aleve or Naproxen instead of celebrex.  Swelling may travel all the way down leg to foot, this is normal and will subside in a few weeks.  Call to schedule an appt with Dr. Sousa for follow up after discharge, usually 2-3 weeks postop.  Contact doctor if you experience: fever greater than 101.5, chills, chest pain, difficulty breathing, redness or excessive drainage around the incision, other concerns.  Follow up with primary care provider.

## 2018-11-09 NOTE — PROGRESS NOTE ADULT - SUBJECTIVE AND OBJECTIVE BOX
Interval Events: Reviewed  Patient seen and examined at bedside.    Patient is a 77y old  Female who presents with a chief complaint of left knee pain (09 Nov 2018 11:48)    she is better and eating.  She did not get oob  PAST MEDICAL & SURGICAL HISTORY:  Restless leg syndrome  Lung tumor  Stroke  HLD (hyperlipidemia)  HTN (hypertension)  Hypothyroidism  DM (diabetes mellitus)  Anxiety and depression  Breast cancer  Lung tumor: left lung rescetion doesnot know what part.  Lung tumor: right upper lobe  H/O arthroscopy of right knee: 1998  H/O mastectomy, right: 1991      MEDICATIONS:  Pulmonary:  montelukast 10 milliGRAM(s) Oral daily    Antimicrobials:    Anticoagulants:  aspirin enteric coated 325 milliGRAM(s) Oral two times a day    Cardiac:  losartan 50 milliGRAM(s) Oral two times a day      Allergies    No Known Allergies    Intolerances        Vital Signs Last 24 Hrs  T(C): 37.8 (09 Nov 2018 15:29), Max: 37.8 (09 Nov 2018 15:29)  T(F): 100 (09 Nov 2018 15:29), Max: 100 (09 Nov 2018 15:29)  HR: 85 (09 Nov 2018 15:29) (85 - 90)  BP: 122/50 (09 Nov 2018 15:29) (122/50 - 174/72)  BP(mean): --  RR: 15 (09 Nov 2018 15:29) (14 - 16)  SpO2: 95% (09 Nov 2018 15:29) (95% - 98%)    11-08 @ 07:01  -  11-09 @ 07:00  --------------------------------------------------------  IN: 200 mL / OUT: 1600 mL / NET: -1400 mL          LABS:      CBC Full  -  ( 08 Nov 2018 06:00 )  WBC Count : 16.9 K/uL  Hemoglobin : 9.5 g/dL  Hematocrit : 30.4 %  Platelet Count - Automated : 188 K/uL  Mean Cell Volume : 92.1 fL  Mean Cell Hemoglobin : 28.8 pg  Mean Cell Hemoglobin Concentration : 31.3 g/dL  Auto Neutrophil # : x  Auto Lymphocyte # : x  Auto Monocyte # : x  Auto Eosinophil # : x  Auto Basophil # : x  Auto Neutrophil % : 81.0 %  Auto Lymphocyte % : 4.7 %  Auto Monocyte % : 14.1 %  Auto Eosinophil % : x  Auto Basophil % : 0.2 %    11-08    137  |  96  |  20  ----------------------------<  254<H>  4.2   |  26  |  0.82    Ca    9.6      08 Nov 2018 06:00                          RADIOLOGY & ADDITIONAL STUDIES (The following images were personally reviewed):  Garibay:                                     No  Urine output:                       adequate  DVT prophylaxis:                 Yes  Flattus:                                  Yes  Bowel movement:              No

## 2018-11-09 NOTE — DISCHARGE NOTE ADULT - NS AS ACTIVITY OBS
Stairs allowed/No Heavy lifting/straining/Walking-Outdoors allowed/Do not drive or operate machinery/Showering allowed/Walking-Indoors allowed

## 2018-11-09 NOTE — DISCHARGE NOTE ADULT - HOSPITAL COURSE
Admitted  Surgery Left TKR 11/7/18  Melisa-op Antibiotics  Pain control  DVT prophylaxis  OOB/Physical Therapy

## 2018-11-09 NOTE — PROGRESS NOTE ADULT - SUBJECTIVE AND OBJECTIVE BOX
SUBJECTIVE: Patient seen and examined. Pain controlled.  Pt did well o/n  No f/c/n/v/cp/sob.     OBJECTIVE:  NAD  Vital Signs Last 24 Hrs  T(C): 36.3 (09 Nov 2018 04:03), Max: 37.2 (08 Nov 2018 15:30)  T(F): 97.3 (09 Nov 2018 04:03), Max: 99 (08 Nov 2018 15:30)  HR: 88 (09 Nov 2018 04:03) (74 - 98)  BP: 174/72 (09 Nov 2018 04:03) (144/68 - 176/74)  BP(mean): --  RR: 14 (09 Nov 2018 04:03) (14 - 17)  SpO2: 97% (09 Nov 2018 04:03) (96% - 98%)    Affected extremity:          Dressing: clean/dry/intact, ACE bandage in place. Cryocuff           Sensation: SILT saph/sural/sp/dp/t         Motor exam: 5/5 TA/GS/EHL         warm well perfused; capillary refill <3 seconds, DP2+                                  9.5    16.9  )-----------( 188      ( 08 Nov 2018 06:00 )             30.4     A/P :  Pt is a 78yo Female s/p  L TKA  -    Pain control  -    DVT ppx: ASA  -    Weight bearing status: WBAT   -    Physical Therapy  -    Dispo: pending

## 2018-11-10 VITALS — HEART RATE: 72 BPM | OXYGEN SATURATION: 95 % | DIASTOLIC BLOOD PRESSURE: 63 MMHG | SYSTOLIC BLOOD PRESSURE: 138 MMHG

## 2018-11-10 LAB
ANION GAP SERPL CALC-SCNC: 13 MMOL/L — SIGNIFICANT CHANGE UP (ref 5–17)
BUN SERPL-MCNC: 25 MG/DL — HIGH (ref 7–23)
CALCIUM SERPL-MCNC: 9.4 MG/DL — SIGNIFICANT CHANGE UP (ref 8.4–10.5)
CHLORIDE SERPL-SCNC: 98 MMOL/L — SIGNIFICANT CHANGE UP (ref 96–108)
CO2 SERPL-SCNC: 27 MMOL/L — SIGNIFICANT CHANGE UP (ref 22–31)
CREAT SERPL-MCNC: 0.97 MG/DL — SIGNIFICANT CHANGE UP (ref 0.5–1.3)
GLUCOSE BLDC GLUCOMTR-MCNC: 155 MG/DL — HIGH (ref 70–99)
GLUCOSE BLDC GLUCOMTR-MCNC: 206 MG/DL — HIGH (ref 70–99)
GLUCOSE SERPL-MCNC: 148 MG/DL — HIGH (ref 70–99)
HCT VFR BLD CALC: 27.6 % — LOW (ref 34.5–45)
HGB BLD-MCNC: 8.6 G/DL — LOW (ref 11.5–15.5)
MCHC RBC-ENTMCNC: 29.1 PG — SIGNIFICANT CHANGE UP (ref 27–34)
MCHC RBC-ENTMCNC: 31.2 G/DL — LOW (ref 32–36)
MCV RBC AUTO: 93.2 FL — SIGNIFICANT CHANGE UP (ref 80–100)
PLATELET # BLD AUTO: 161 K/UL — SIGNIFICANT CHANGE UP (ref 150–400)
POTASSIUM SERPL-MCNC: 3.8 MMOL/L — SIGNIFICANT CHANGE UP (ref 3.5–5.3)
POTASSIUM SERPL-SCNC: 3.8 MMOL/L — SIGNIFICANT CHANGE UP (ref 3.5–5.3)
RBC # BLD: 2.96 M/UL — LOW (ref 3.8–5.2)
RBC # FLD: 14.6 % — SIGNIFICANT CHANGE UP (ref 10.3–16.9)
SODIUM SERPL-SCNC: 138 MMOL/L — SIGNIFICANT CHANGE UP (ref 135–145)
WBC # BLD: 12.3 K/UL — HIGH (ref 3.8–10.5)
WBC # FLD AUTO: 12.3 K/UL — HIGH (ref 3.8–10.5)

## 2018-11-10 PROCEDURE — 99232 SBSQ HOSP IP/OBS MODERATE 35: CPT | Mod: GC

## 2018-11-10 RX ADMIN — Medication 2 UNIT(S): at 07:08

## 2018-11-10 RX ADMIN — CELECOXIB 200 MILLIGRAM(S): 200 CAPSULE ORAL at 07:10

## 2018-11-10 RX ADMIN — Medication 75 MICROGRAM(S): at 06:10

## 2018-11-10 RX ADMIN — Medication 100 MILLIGRAM(S): at 12:56

## 2018-11-10 RX ADMIN — Medication 2: at 07:07

## 2018-11-10 RX ADMIN — LOSARTAN POTASSIUM 50 MILLIGRAM(S): 100 TABLET, FILM COATED ORAL at 06:10

## 2018-11-10 RX ADMIN — Medication 4: at 12:53

## 2018-11-10 RX ADMIN — CELECOXIB 200 MILLIGRAM(S): 200 CAPSULE ORAL at 06:11

## 2018-11-10 RX ADMIN — Medication 650 MILLIGRAM(S): at 13:30

## 2018-11-10 RX ADMIN — Medication 650 MILLIGRAM(S): at 06:10

## 2018-11-10 RX ADMIN — Medication 650 MILLIGRAM(S): at 12:56

## 2018-11-10 RX ADMIN — Medication 650 MILLIGRAM(S): at 07:10

## 2018-11-10 RX ADMIN — Medication 325 MILLIGRAM(S): at 12:56

## 2018-11-10 RX ADMIN — Medication 2 UNIT(S): at 12:53

## 2018-11-10 NOTE — PROGRESS NOTE ADULT - SUBJECTIVE AND OBJECTIVE BOX
SUBJECTIVE: Patient seen and examined. Pain controlled.  Pt did well o/n  No f/c/n/v/cp/sob.     OBJECTIVE:  NAD  Vital Signs Last 24 Hrs  T(C): 36.9 (10 Nov 2018 05:20), Max: 37.8 (09 Nov 2018 15:29)  T(F): 98.5 (10 Nov 2018 05:20), Max: 100 (09 Nov 2018 15:29)  HR: 82 (10 Nov 2018 05:20) (75 - 90)  BP: 146/79 (10 Nov 2018 05:20) (122/50 - 165/70)  BP(mean): --  RR: 16 (10 Nov 2018 05:20) (15 - 16)  SpO2: 94% (10 Nov 2018 05:20) (94% - 98%)    Affected extremity:          Dressing: clean/dry/intact, ACE bandage in place. Cryocuff           Sensation: SILT saph/sural/sp/dp/t         Motor exam: 5/5 TA/GS/EHL         warm well perfused; capillary refill <3 seconds, DP2+                                     8.6    12.3  )-----------( 161      ( 10 Nov 2018 06:16 )             27.6       A/P :  Pt is a 78yo Female s/p  L TKA  -    Pain control  -    DVT ppx: ASA  -    Weight bearing status: WBAT   -    Physical Therapy  -    Dispo: pending

## 2018-11-10 NOTE — PROGRESS NOTE ADULT - NS NEC GEN PE MLT EXAM PC
5 day old baby boy delivered vaginally born at 35.6 weeks gestation . Feeding aprox 20ml to 30 ml per feed, frequent spitting up of formula.  No acute events overnight. Pt. is voiding, stooling appropriately. Juandice improving well.    Vital Signs Last 24 Hrs  T(C): 37.1 (21 Nov 2018 07:50), Max: 37.1 (21 Nov 2018 07:50)  T(F): 98.7 (21 Nov 2018 07:50), Max: 98.7 (21 Nov 2018 07:50)  HR: 144 (21 Nov 2018 07:50) (144 - 150)  RR: 42 (21 Nov 2018 07:50) (42 - 46)    Physical exam  General: swaddled, quiet in crib  Head: Anterior and posterior fontanels open and flat  Eyes: + red eye reflex bilaterally  Ears: patent bilaterally, no deformities  Nose: nares clinically patent  Mouth/Throat: no cleft lip or palate, no lesions  Neck: no masses, intact clavicles  Cardiovascular: +S1,S2, no murmurs, 2+ femoral pulses bilaterally  Respiratory: no retractions, Lungs clear to auscultation bilaterally, no wheezing, rales or rhonchi  Abdomen: soft, non-distended, + BS, no masses, no organomegaly, umbilical cord stump attached  Genitourinary: normal external genitalia, anus patent  Back: spine straight, no sacral dimple or tags  Extremities: FROM x 4, negative Ortolani/Stauffer, 10 fingers & 10 toes  Skin: pink, no lesions, rashes or icteric skin or mucosae  Neurological: reactive on exam, +suck, +grasp, +Babinski, + [place]    Plan:  1- Discontinue phototherapy, rebound bilirubin at 3pm today.  2- Encourage PO feeds, change formula to Enfamil gentelease , head of bed elevation after feedings.  4- Monitor weight loss. No bruits; no thyromegaly or nodules

## 2018-11-10 NOTE — PROGRESS NOTE ADULT - PROBLEM SELECTOR PLAN 4
BS is controlled an she is on lantus.  Her BS is better controlled and she is eating more. The blood sugar starting to increase and might need to adjust the insulin

## 2018-11-10 NOTE — PROGRESS NOTE ADULT - ATTENDING COMMENTS
Patient seen and examined with house-staff during bedside rounds.  Resident note read, including vitals, physical findings, laboratory data, and radiological reports.   Revisions included below.  Direct personal management at bed side and extensive interpretation of the data.  Plan was outlined and discussed in details with the housestaff.  Decision making of high complexity  Action taken for acute disease activity to reflect the level of care provided:  - medication reconciliation  - review laboratory data  haldol as needed    The patient is medically cleared for rehab

## 2018-11-10 NOTE — PROGRESS NOTE ADULT - REASON FOR ADMISSION
left knee pain

## 2018-11-10 NOTE — PROGRESS NOTE ADULT - SUBJECTIVE AND OBJECTIVE BOX
Interval Events: Reviewed  Patient seen and examined at bedside.    Patient is a 77y old  Female who presents with a chief complaint of left knee pain (10 Nov 2018 06:45)  She is more awake. She walked to the bathroom. She's eating well.    PAST MEDICAL & SURGICAL HISTORY:  Restless leg syndrome  Lung tumor  Stroke  HLD (hyperlipidemia)  HTN (hypertension)  Hypothyroidism  DM (diabetes mellitus)  Anxiety and depression  Breast cancer  Lung tumor: left lung rescetion doesnot know what part.  Lung tumor: right upper lobe  H/O arthroscopy of right knee: 1998  H/O mastectomy, right: 1991      MEDICATIONS:  Pulmonary:  montelukast 10 milliGRAM(s) Oral daily    Antimicrobials:    Anticoagulants:  aspirin enteric coated 325 milliGRAM(s) Oral two times a day    Cardiac:  losartan 50 milliGRAM(s) Oral two times a day      Allergies    No Known Allergies    Intolerances        Vital Signs Last 24 Hrs  T(C): 37.5 (10 Nov 2018 08:30), Max: 37.5 (10 Nov 2018 08:30)  T(F): 99.5 (10 Nov 2018 08:30), Max: 99.5 (10 Nov 2018 08:30)  HR: 72 (10 Nov 2018 09:00) (72 - 82)  BP: 138/63 (10 Nov 2018 09:00) (135/82 - 150/82)  BP(mean): --  RR: 16 (10 Nov 2018 08:30) (16 - 16)  SpO2: 95% (10 Nov 2018 09:00) (94% - 96%)        LABS:      CBC Full  -  ( 10 Nov 2018 06:16 )  WBC Count : 12.3 K/uL  Hemoglobin : 8.6 g/dL  Hematocrit : 27.6 %  Platelet Count - Automated : 161 K/uL  Mean Cell Volume : 93.2 fL  Mean Cell Hemoglobin : 29.1 pg  Mean Cell Hemoglobin Concentration : 31.2 g/dL  Auto Neutrophil # : x  Auto Lymphocyte # : x  Auto Monocyte # : x  Auto Eosinophil # : x  Auto Basophil # : x  Auto Neutrophil % : x  Auto Lymphocyte % : x  Auto Monocyte % : x  Auto Eosinophil % : x  Auto Basophil % : x    11-10    138  |  98  |  25<H>  ----------------------------<  148<H>  3.8   |  27  |  0.97    Ca    9.4      10 Nov 2018 06:16                          RADIOLOGY & ADDITIONAL STUDIES (The following images were personally reviewed):  Garibay:                                     No  Urine output:                       adequate  DVT prophylaxis:                 Yes  Flattus:                                  Yes  Bowel movement:              No

## 2018-11-15 PROBLEM — Z00.00 ENCOUNTER FOR PREVENTIVE HEALTH EXAMINATION: Status: ACTIVE | Noted: 2018-11-15

## 2019-01-02 ENCOUNTER — MOBILE ON CALL (OUTPATIENT)
Age: 79
End: 2019-01-02

## 2019-02-08 PROBLEM — I10 ESSENTIAL (PRIMARY) HYPERTENSION: Chronic | Status: ACTIVE | Noted: 2018-11-06

## 2019-02-08 PROBLEM — E78.5 HYPERLIPIDEMIA, UNSPECIFIED: Chronic | Status: ACTIVE | Noted: 2018-11-06

## 2019-02-08 PROBLEM — E03.9 HYPOTHYROIDISM, UNSPECIFIED: Chronic | Status: ACTIVE | Noted: 2018-11-06

## 2019-02-08 PROBLEM — C50.919 MALIGNANT NEOPLASM OF UNSPECIFIED SITE OF UNSPECIFIED FEMALE BREAST: Chronic | Status: ACTIVE | Noted: 2018-11-06

## 2019-02-08 PROBLEM — F41.9 ANXIETY DISORDER, UNSPECIFIED: Chronic | Status: ACTIVE | Noted: 2018-11-06

## 2019-02-08 PROBLEM — I63.9 CEREBRAL INFARCTION, UNSPECIFIED: Chronic | Status: ACTIVE | Noted: 2018-11-06

## 2019-02-08 PROBLEM — E11.9 TYPE 2 DIABETES MELLITUS WITHOUT COMPLICATIONS: Chronic | Status: ACTIVE | Noted: 2018-11-06

## 2019-02-08 PROBLEM — G25.81 RESTLESS LEGS SYNDROME: Chronic | Status: ACTIVE | Noted: 2018-11-07

## 2019-02-12 VITALS
OXYGEN SATURATION: 96 % | TEMPERATURE: 98 F | HEIGHT: 62 IN | RESPIRATION RATE: 18 BRPM | DIASTOLIC BLOOD PRESSURE: 67 MMHG | SYSTOLIC BLOOD PRESSURE: 144 MMHG | HEART RATE: 93 BPM | WEIGHT: 195.33 LBS

## 2019-02-12 RX ORDER — MIRTAZAPINE 45 MG/1
0 TABLET, ORALLY DISINTEGRATING ORAL
Qty: 0 | Refills: 0 | COMMUNITY

## 2019-02-13 ENCOUNTER — INPATIENT (INPATIENT)
Facility: HOSPITAL | Age: 79
LOS: 5 days | Discharge: NURSING, REHAB OR LONG-TERM CARE | DRG: 488 | End: 2019-02-19
Payer: MEDICARE

## 2019-02-13 ENCOUNTER — RESULT REVIEW (OUTPATIENT)
Age: 79
End: 2019-02-13

## 2019-02-13 DIAGNOSIS — Z96.652 PRESENCE OF LEFT ARTIFICIAL KNEE JOINT: Chronic | ICD-10-CM

## 2019-02-13 DIAGNOSIS — E78.5 HYPERLIPIDEMIA, UNSPECIFIED: ICD-10-CM

## 2019-02-13 DIAGNOSIS — D49.1 NEOPLASM OF UNSPECIFIED BEHAVIOR OF RESPIRATORY SYSTEM: Chronic | ICD-10-CM

## 2019-02-13 DIAGNOSIS — I10 ESSENTIAL (PRIMARY) HYPERTENSION: ICD-10-CM

## 2019-02-13 DIAGNOSIS — E11.9 TYPE 2 DIABETES MELLITUS WITHOUT COMPLICATIONS: ICD-10-CM

## 2019-02-13 DIAGNOSIS — C34.90 MALIGNANT NEOPLASM OF UNSPECIFIED PART OF UNSPECIFIED BRONCHUS OR LUNG: ICD-10-CM

## 2019-02-13 DIAGNOSIS — E03.9 HYPOTHYROIDISM, UNSPECIFIED: ICD-10-CM

## 2019-02-13 DIAGNOSIS — I63.9 CEREBRAL INFARCTION, UNSPECIFIED: ICD-10-CM

## 2019-02-13 DIAGNOSIS — M25.362 OTHER INSTABILITY, LEFT KNEE: ICD-10-CM

## 2019-02-13 DIAGNOSIS — Z98.890 OTHER SPECIFIED POSTPROCEDURAL STATES: Chronic | ICD-10-CM

## 2019-02-13 DIAGNOSIS — J44.9 CHRONIC OBSTRUCTIVE PULMONARY DISEASE, UNSPECIFIED: ICD-10-CM

## 2019-02-13 DIAGNOSIS — Z96.651 PRESENCE OF RIGHT ARTIFICIAL KNEE JOINT: Chronic | ICD-10-CM

## 2019-02-13 DIAGNOSIS — Z90.11 ACQUIRED ABSENCE OF RIGHT BREAST AND NIPPLE: Chronic | ICD-10-CM

## 2019-02-13 DIAGNOSIS — F41.9 ANXIETY DISORDER, UNSPECIFIED: ICD-10-CM

## 2019-02-13 LAB
BASOPHILS # BLD AUTO: 0.07 K/UL — SIGNIFICANT CHANGE UP (ref 0–0.2)
BASOPHILS NFR BLD AUTO: 0.6 % — SIGNIFICANT CHANGE UP (ref 0–2)
EOSINOPHIL # BLD AUTO: 0.48 K/UL — SIGNIFICANT CHANGE UP (ref 0–0.5)
EOSINOPHIL NFR BLD AUTO: 4 % — SIGNIFICANT CHANGE UP (ref 0–6)
GLUCOSE BLDC GLUCOMTR-MCNC: 130 MG/DL — HIGH (ref 70–99)
GLUCOSE BLDC GLUCOMTR-MCNC: 144 MG/DL — HIGH (ref 70–99)
GLUCOSE BLDC GLUCOMTR-MCNC: 159 MG/DL — HIGH (ref 70–99)
GLUCOSE BLDC GLUCOMTR-MCNC: 170 MG/DL — HIGH (ref 70–99)
HCT VFR BLD CALC: 30.7 % — LOW (ref 34.5–45)
HGB BLD-MCNC: 9.7 G/DL — LOW (ref 11.5–15.5)
IMM GRANULOCYTES NFR BLD AUTO: 0.7 % — SIGNIFICANT CHANGE UP (ref 0–1.5)
LYMPHOCYTES # BLD AUTO: 2.52 K/UL — SIGNIFICANT CHANGE UP (ref 1–3.3)
LYMPHOCYTES # BLD AUTO: 20.8 % — SIGNIFICANT CHANGE UP (ref 13–44)
MCHC RBC-ENTMCNC: 30 PG — SIGNIFICANT CHANGE UP (ref 27–34)
MCHC RBC-ENTMCNC: 31.6 GM/DL — LOW (ref 32–36)
MCV RBC AUTO: 95 FL — SIGNIFICANT CHANGE UP (ref 80–100)
MONOCYTES # BLD AUTO: 1.09 K/UL — HIGH (ref 0–0.9)
MONOCYTES NFR BLD AUTO: 9 % — SIGNIFICANT CHANGE UP (ref 2–14)
MRSA PCR RESULT.: NEGATIVE — SIGNIFICANT CHANGE UP
NEUTROPHILS # BLD AUTO: 7.87 K/UL — HIGH (ref 1.8–7.4)
NEUTROPHILS NFR BLD AUTO: 64.9 % — SIGNIFICANT CHANGE UP (ref 43–77)
NRBC # BLD: 0 /100 WBCS — SIGNIFICANT CHANGE UP (ref 0–0)
PLATELET # BLD AUTO: 220 K/UL — SIGNIFICANT CHANGE UP (ref 150–400)
RBC # BLD: 3.23 M/UL — LOW (ref 3.8–5.2)
RBC # FLD: 13.7 % — SIGNIFICANT CHANGE UP (ref 10.3–14.5)
S AUREUS DNA NOSE QL NAA+PROBE: NEGATIVE — SIGNIFICANT CHANGE UP
WBC # BLD: 12.11 K/UL — HIGH (ref 3.8–10.5)
WBC # FLD AUTO: 12.11 K/UL — HIGH (ref 3.8–10.5)

## 2019-02-13 PROCEDURE — 73560 X-RAY EXAM OF KNEE 1 OR 2: CPT | Mod: 26,LT

## 2019-02-13 RX ORDER — CEFAZOLIN SODIUM 1 G
2000 VIAL (EA) INJECTION EVERY 8 HOURS
Qty: 0 | Refills: 0 | Status: COMPLETED | OUTPATIENT
Start: 2019-02-13 | End: 2019-02-14

## 2019-02-13 RX ORDER — POLYETHYLENE GLYCOL 3350 17 G/17G
17 POWDER, FOR SOLUTION ORAL DAILY
Qty: 0 | Refills: 0 | Status: DISCONTINUED | OUTPATIENT
Start: 2019-02-13 | End: 2019-02-19

## 2019-02-13 RX ORDER — ASPIRIN/CALCIUM CARB/MAGNESIUM 324 MG
325 TABLET ORAL
Qty: 0 | Refills: 0 | Status: DISCONTINUED | OUTPATIENT
Start: 2019-02-13 | End: 2019-02-19

## 2019-02-13 RX ORDER — GABAPENTIN 400 MG/1
0 CAPSULE ORAL
Qty: 0 | Refills: 0 | COMMUNITY

## 2019-02-13 RX ORDER — SENNA PLUS 8.6 MG/1
2 TABLET ORAL AT BEDTIME
Qty: 0 | Refills: 0 | Status: DISCONTINUED | OUTPATIENT
Start: 2019-02-13 | End: 2019-02-19

## 2019-02-13 RX ORDER — MONTELUKAST 4 MG/1
1 TABLET, CHEWABLE ORAL
Qty: 0 | Refills: 0 | COMMUNITY

## 2019-02-13 RX ORDER — DEXTROSE 50 % IN WATER 50 %
12.5 SYRINGE (ML) INTRAVENOUS ONCE
Qty: 0 | Refills: 0 | Status: DISCONTINUED | OUTPATIENT
Start: 2019-02-13 | End: 2019-02-19

## 2019-02-13 RX ORDER — DOCUSATE SODIUM 100 MG
100 CAPSULE ORAL THREE TIMES A DAY
Qty: 0 | Refills: 0 | Status: DISCONTINUED | OUTPATIENT
Start: 2019-02-13 | End: 2019-02-19

## 2019-02-13 RX ORDER — INSULIN LISPRO 100/ML
0 VIAL (ML) SUBCUTANEOUS
Qty: 0 | Refills: 0 | COMMUNITY

## 2019-02-13 RX ORDER — ASPIRIN/CALCIUM CARB/MAGNESIUM 324 MG
1 TABLET ORAL
Qty: 0 | Refills: 0 | COMMUNITY
Start: 2019-02-13 | End: 2019-03-13

## 2019-02-13 RX ORDER — ONDANSETRON 8 MG/1
4 TABLET, FILM COATED ORAL EVERY 6 HOURS
Qty: 0 | Refills: 0 | Status: DISCONTINUED | OUTPATIENT
Start: 2019-02-13 | End: 2019-02-19

## 2019-02-13 RX ORDER — SODIUM CHLORIDE 9 MG/ML
1000 INJECTION, SOLUTION INTRAVENOUS
Qty: 0 | Refills: 0 | Status: DISCONTINUED | OUTPATIENT
Start: 2019-02-13 | End: 2019-02-19

## 2019-02-13 RX ORDER — CEFAZOLIN SODIUM 1 G
2000 VIAL (EA) INJECTION EVERY 8 HOURS
Qty: 0 | Refills: 0 | Status: DISCONTINUED | OUTPATIENT
Start: 2019-02-13 | End: 2019-02-13

## 2019-02-13 RX ORDER — DEXTROSE 50 % IN WATER 50 %
25 SYRINGE (ML) INTRAVENOUS ONCE
Qty: 0 | Refills: 0 | Status: DISCONTINUED | OUTPATIENT
Start: 2019-02-13 | End: 2019-02-19

## 2019-02-13 RX ORDER — GLUCAGON INJECTION, SOLUTION 0.5 MG/.1ML
1 INJECTION, SOLUTION SUBCUTANEOUS ONCE
Qty: 0 | Refills: 0 | Status: DISCONTINUED | OUTPATIENT
Start: 2019-02-13 | End: 2019-02-19

## 2019-02-13 RX ORDER — INSULIN GLARGINE 100 [IU]/ML
7 INJECTION, SOLUTION SUBCUTANEOUS
Qty: 0 | Refills: 0 | COMMUNITY

## 2019-02-13 RX ORDER — MAGNESIUM HYDROXIDE 400 MG/1
30 TABLET, CHEWABLE ORAL DAILY
Qty: 0 | Refills: 0 | Status: DISCONTINUED | OUTPATIENT
Start: 2019-02-13 | End: 2019-02-19

## 2019-02-13 RX ORDER — MORPHINE SULFATE 50 MG/1
4 CAPSULE, EXTENDED RELEASE ORAL ONCE
Qty: 0 | Refills: 0 | Status: DISCONTINUED | OUTPATIENT
Start: 2019-02-13 | End: 2019-02-13

## 2019-02-13 RX ORDER — ENOXAPARIN SODIUM 100 MG/ML
12 INJECTION SUBCUTANEOUS
Qty: 0 | Refills: 0 | COMMUNITY

## 2019-02-13 RX ORDER — LEVOTHYROXINE SODIUM 125 MCG
1 TABLET ORAL
Qty: 0 | Refills: 0 | COMMUNITY

## 2019-02-13 RX ORDER — INSULIN ASPART 100 [IU]/ML
4 INJECTION, SOLUTION SUBCUTANEOUS
Qty: 0 | Refills: 0 | COMMUNITY

## 2019-02-13 RX ORDER — INSULIN LISPRO 100/ML
VIAL (ML) SUBCUTANEOUS
Qty: 0 | Refills: 0 | Status: DISCONTINUED | OUTPATIENT
Start: 2019-02-13 | End: 2019-02-19

## 2019-02-13 RX ORDER — METFORMIN HYDROCHLORIDE 850 MG/1
1 TABLET ORAL
Qty: 0 | Refills: 0 | COMMUNITY

## 2019-02-13 RX ORDER — DEXTROSE 50 % IN WATER 50 %
15 SYRINGE (ML) INTRAVENOUS ONCE
Qty: 0 | Refills: 0 | Status: DISCONTINUED | OUTPATIENT
Start: 2019-02-13 | End: 2019-02-19

## 2019-02-13 RX ORDER — LOSARTAN POTASSIUM 100 MG/1
1 TABLET, FILM COATED ORAL
Qty: 0 | Refills: 0 | COMMUNITY

## 2019-02-13 RX ORDER — ATORVASTATIN CALCIUM 80 MG/1
1 TABLET, FILM COATED ORAL
Qty: 0 | Refills: 0 | COMMUNITY

## 2019-02-13 RX ORDER — CHOLECALCIFEROL (VITAMIN D3) 125 MCG
1 CAPSULE ORAL
Qty: 0 | Refills: 0 | COMMUNITY

## 2019-02-13 RX ORDER — OXYCODONE HYDROCHLORIDE 5 MG/1
10 TABLET ORAL EVERY 4 HOURS
Qty: 0 | Refills: 0 | Status: DISCONTINUED | OUTPATIENT
Start: 2019-02-13 | End: 2019-02-16

## 2019-02-13 RX ORDER — OMEPRAZOLE 10 MG/1
1 CAPSULE, DELAYED RELEASE ORAL
Qty: 0 | Refills: 0 | COMMUNITY

## 2019-02-13 RX ADMIN — Medication 100 MILLIGRAM(S): at 21:44

## 2019-02-13 RX ADMIN — Medication 2000 MILLIGRAM(S): at 18:24

## 2019-02-13 RX ADMIN — Medication 325 MILLIGRAM(S): at 18:25

## 2019-02-13 RX ADMIN — Medication 1: at 18:26

## 2019-02-13 RX ADMIN — MORPHINE SULFATE 4 MILLIGRAM(S): 50 CAPSULE, EXTENDED RELEASE ORAL at 16:01

## 2019-02-13 NOTE — H&P ADULT - HISTORY OF PRESENT ILLNESS
79yo f c/o left knee pain . Pt. is coming from rehab, in which she had left tkr performed in November 2018. Pt. went to rehab when she was having knee instability in which her leg gave and she fell. Pt. dislocated her left knee. Pt. was sent to [facility] in which knee was reduced and sent back to rehab. Pt. followed up at Dr. Sousa office in which she was put in hinged knee brace for stability. Pt. cesia has MCL sprain per Dr. Sousa. Pt. has been non weight bearing and not walking for the past week per family to reduce risk of dislocation again. Pt. was on lovenox but has stopped, Denies any numbness/tingling in b/l les. Pt. has hx of CVA in 2017 with left UE weakness. Presents today for elective LEFT TKR REVISION.

## 2019-02-13 NOTE — H&P ADULT - NSHPPHYSICALEXAM_GEN_ALL_CORE
GENERAL: pt. sleepy but arousable, laying in stretcher  MSK- left le skin intact, + incision intact, SLT INTACT, DP/PT 2+, EHL/TA/GS 5/5. Decreased ROM secondary to pain. Rest  of PE per medical clearance.

## 2019-02-13 NOTE — PHYSICAL THERAPY INITIAL EVALUATION ADULT - CRITERIA FOR SKILLED THERAPEUTIC INTERVENTIONS
functional limitations in following categories/therapy frequency/anticipated discharge recommendation/impairments found/rehab potential

## 2019-02-13 NOTE — H&P ADULT - NSHPLABSRESULTS_GEN_ALL_CORE
preop cbc/bmp/coags/ua wnl per medical clearance   EKG- sinus rhythm with occasional PVC otherwise normal  CXR- post op changes in right lung (pt. has hx of lung resection)  mary ellen DOS

## 2019-02-13 NOTE — PHYSICAL THERAPY INITIAL EVALUATION ADULT - PERTINENT HX OF CURRENT PROBLEM, REHAB EVAL
79yo f c/o left knee pain . Pt. is coming from rehab, in which she had left tkr performed in November 2018. Pt. went to rehab when she was having knee instability in which her leg gave and she fell. Pt. dislocated her left knee. Pt. was sent to [facility] in which knee was reduced and sent back to rehab. Pt. followed up at Dr. Sousa office in which she was put in hinged knee brace for stability. Pt. cesia has MCL sprain per Dr. Sousa

## 2019-02-13 NOTE — PROGRESS NOTE ADULT - SUBJECTIVE AND OBJECTIVE BOX
Orthopaedics Post Op Check    Procedure: Revision left total knee replacement   Surgeon: Dr. Sousa     Pt comfortable, without complaints  Denies CP, SOB, N/V, numbness/tingling     Vital Signs Last 24 Hrs  T(C): 36.5 (13 Feb 2019 14:02), Max: 36.5 (13 Feb 2019 14:02)  T(F): 97.7 (13 Feb 2019 14:02), Max: 97.7 (13 Feb 2019 14:02)  HR: 66 (13 Feb 2019 15:06) (66 - 84)  BP: 152/62 (13 Feb 2019 15:06) (115/56 - 152/62)  BP(mean): 89 (13 Feb 2019 15:06) (80 - 95)  RR: 10 (13 Feb 2019 15:06) (10 - 22)  SpO2: 100% (13 Feb 2019 15:06) (94% - 100%)  AVSS, NAD    Dressing C/D/I; BRENT  General: Pt Alert and oriented     Pulses: DP/PT pulses 2+  Sensation: SLT in tact and equal to distal bilateral lower extremities   Motor: EHL/FHL/TA/GS 5/5 motor strength bilaterally                           9.7    12.11 )-----------( 220      ( 13 Feb 2019 14:34 )             30.7         Post op XR: left knee prosthesis in place     A/P: 78yFemale POD#0 s/p revision left TKR  - Stable  - Pain Control  - DVT ppx: ASA  - Post op abx: Ancef  - PT, WBS: WBAT  - F/U AM Labs

## 2019-02-13 NOTE — H&P ADULT - PMH
Anxiety and depression    Breast cancer    COPD (chronic obstructive pulmonary disease)    DM (diabetes mellitus)    HLD (hyperlipidemia)    HTN (hypertension)    Hypothyroidism    Lung cancer  left, 2015  Lung cancer  right 1980  Restless leg syndrome    Stroke

## 2019-02-13 NOTE — BRIEF OPERATIVE NOTE - PROCEDURE
<<-----Click on this checkbox to enter Procedure Revision of left total knee arthroplasty  02/13/2019    Active  MEHUL

## 2019-02-13 NOTE — PHYSICAL THERAPY INITIAL EVALUATION ADULT - MANUAL MUSCLE TESTING RESULTS, REHAB EVAL
functional observation against gravity > 3/5 MMT except LUE 0/5 from old CVA/grossly assessed due to

## 2019-02-13 NOTE — PHYSICAL THERAPY INITIAL EVALUATION ADULT - ADDITIONAL COMMENTS
fall at rehab which led to patient to L TKA this admission. Prior to rehab, patient had CVA in Nov 2018. After CVA, patient ambulating with hemiwalker and toileting independently. Patient's daughter was bathing patient. Lived with family in home with 9 steps and R handrail. Has shower chair, commode, grab bars.

## 2019-02-13 NOTE — PHYSICAL THERAPY INITIAL EVALUATION ADULT - GENERAL OBSERVATIONS, REHAB EVAL
Received supine in NAD, denies pain at rest +IV, B SCD, O2 NC 2L, BRENT. Left as found +bed alarm, call MEGAN mixon aware

## 2019-02-13 NOTE — PHYSICAL THERAPY INITIAL EVALUATION ADULT - IMPAIRMENTS CONTRIBUTING TO GAIT DEVIATIONS, PT EVAL
decreased strength/impaired balance/narrow base of support/pain/abnormal muscle tone/impaired postural control

## 2019-02-14 DIAGNOSIS — R41.89 OTHER SYMPTOMS AND SIGNS INVOLVING COGNITIVE FUNCTIONS AND AWARENESS: ICD-10-CM

## 2019-02-14 DIAGNOSIS — F43.23 ADJUSTMENT DISORDER WITH MIXED ANXIETY AND DEPRESSED MOOD: ICD-10-CM

## 2019-02-14 LAB
ANION GAP SERPL CALC-SCNC: 13 MMOL/L — SIGNIFICANT CHANGE UP (ref 5–17)
BUN SERPL-MCNC: 15 MG/DL — SIGNIFICANT CHANGE UP (ref 7–23)
CALCIUM SERPL-MCNC: 9.6 MG/DL — SIGNIFICANT CHANGE UP (ref 8.4–10.5)
CHLORIDE SERPL-SCNC: 98 MMOL/L — SIGNIFICANT CHANGE UP (ref 96–108)
CO2 SERPL-SCNC: 27 MMOL/L — SIGNIFICANT CHANGE UP (ref 22–31)
CREAT SERPL-MCNC: 0.84 MG/DL — SIGNIFICANT CHANGE UP (ref 0.5–1.3)
GLUCOSE BLDC GLUCOMTR-MCNC: 157 MG/DL — HIGH (ref 70–99)
GLUCOSE BLDC GLUCOMTR-MCNC: 163 MG/DL — HIGH (ref 70–99)
GLUCOSE BLDC GLUCOMTR-MCNC: 181 MG/DL — HIGH (ref 70–99)
GLUCOSE BLDC GLUCOMTR-MCNC: 184 MG/DL — HIGH (ref 70–99)
GLUCOSE BLDC GLUCOMTR-MCNC: 191 MG/DL — HIGH (ref 70–99)
GLUCOSE SERPL-MCNC: 188 MG/DL — HIGH (ref 70–99)
HCT VFR BLD CALC: 31 % — LOW (ref 34.5–45)
HGB BLD-MCNC: 9.4 G/DL — LOW (ref 11.5–15.5)
MCHC RBC-ENTMCNC: 29.4 PG — SIGNIFICANT CHANGE UP (ref 27–34)
MCHC RBC-ENTMCNC: 30.3 GM/DL — LOW (ref 32–36)
MCV RBC AUTO: 96.9 FL — SIGNIFICANT CHANGE UP (ref 80–100)
NRBC # BLD: 0 /100 WBCS — SIGNIFICANT CHANGE UP (ref 0–0)
PLATELET # BLD AUTO: 225 K/UL — SIGNIFICANT CHANGE UP (ref 150–400)
POTASSIUM SERPL-MCNC: 4.4 MMOL/L — SIGNIFICANT CHANGE UP (ref 3.5–5.3)
POTASSIUM SERPL-SCNC: 4.4 MMOL/L — SIGNIFICANT CHANGE UP (ref 3.5–5.3)
RBC # BLD: 3.2 M/UL — LOW (ref 3.8–5.2)
RBC # FLD: 13.9 % — SIGNIFICANT CHANGE UP (ref 10.3–14.5)
SODIUM SERPL-SCNC: 138 MMOL/L — SIGNIFICANT CHANGE UP (ref 135–145)
WBC # BLD: 15.11 K/UL — HIGH (ref 3.8–10.5)
WBC # FLD AUTO: 15.11 K/UL — HIGH (ref 3.8–10.5)

## 2019-02-14 PROCEDURE — 99221 1ST HOSP IP/OBS SF/LOW 40: CPT

## 2019-02-14 RX ORDER — ACETAMINOPHEN 500 MG
975 TABLET ORAL EVERY 4 HOURS
Qty: 0 | Refills: 0 | Status: DISCONTINUED | OUTPATIENT
Start: 2019-02-14 | End: 2019-02-19

## 2019-02-14 RX ORDER — LANOLIN ALCOHOL/MO/W.PET/CERES
3 CREAM (GRAM) TOPICAL AT BEDTIME
Qty: 0 | Refills: 0 | Status: DISCONTINUED | OUTPATIENT
Start: 2019-02-14 | End: 2019-02-19

## 2019-02-14 RX ORDER — LANOLIN ALCOHOL/MO/W.PET/CERES
3 CREAM (GRAM) TOPICAL AT BEDTIME
Qty: 0 | Refills: 0 | Status: COMPLETED | OUTPATIENT
Start: 2019-02-14 | End: 2019-02-14

## 2019-02-14 RX ORDER — LEVOTHYROXINE SODIUM 125 MCG
75 TABLET ORAL DAILY
Qty: 0 | Refills: 0 | Status: DISCONTINUED | OUTPATIENT
Start: 2019-02-14 | End: 2019-02-19

## 2019-02-14 RX ORDER — LOSARTAN POTASSIUM 100 MG/1
50 TABLET, FILM COATED ORAL
Qty: 0 | Refills: 0 | Status: DISCONTINUED | OUTPATIENT
Start: 2019-02-14 | End: 2019-02-19

## 2019-02-14 RX ORDER — ATORVASTATIN CALCIUM 80 MG/1
20 TABLET, FILM COATED ORAL AT BEDTIME
Qty: 0 | Refills: 0 | Status: DISCONTINUED | OUTPATIENT
Start: 2019-02-14 | End: 2019-02-19

## 2019-02-14 RX ORDER — CITALOPRAM 10 MG/1
10 TABLET, FILM COATED ORAL DAILY
Qty: 0 | Refills: 0 | Status: DISCONTINUED | OUTPATIENT
Start: 2019-02-14 | End: 2019-02-19

## 2019-02-14 RX ORDER — GABAPENTIN 400 MG/1
600 CAPSULE ORAL DAILY
Qty: 0 | Refills: 0 | Status: DISCONTINUED | OUTPATIENT
Start: 2019-02-14 | End: 2019-02-19

## 2019-02-14 RX ORDER — KETOROLAC TROMETHAMINE 30 MG/ML
15 SYRINGE (ML) INJECTION EVERY 6 HOURS
Qty: 0 | Refills: 0 | Status: DISCONTINUED | OUTPATIENT
Start: 2019-02-14 | End: 2019-02-15

## 2019-02-14 RX ORDER — PANTOPRAZOLE SODIUM 20 MG/1
40 TABLET, DELAYED RELEASE ORAL
Qty: 0 | Refills: 0 | Status: DISCONTINUED | OUTPATIENT
Start: 2019-02-14 | End: 2019-02-19

## 2019-02-14 RX ADMIN — Medication 15 MILLIGRAM(S): at 13:05

## 2019-02-14 RX ADMIN — Medication 15 MILLIGRAM(S): at 12:50

## 2019-02-14 RX ADMIN — OXYCODONE HYDROCHLORIDE 10 MILLIGRAM(S): 5 TABLET ORAL at 16:17

## 2019-02-14 RX ADMIN — Medication 15 MILLIGRAM(S): at 17:57

## 2019-02-14 RX ADMIN — POLYETHYLENE GLYCOL 3350 17 GRAM(S): 17 POWDER, FOR SOLUTION ORAL at 12:47

## 2019-02-14 RX ADMIN — Medication 15 MILLIGRAM(S): at 18:57

## 2019-02-14 RX ADMIN — OXYCODONE HYDROCHLORIDE 10 MILLIGRAM(S): 5 TABLET ORAL at 15:17

## 2019-02-14 RX ADMIN — Medication 1: at 22:23

## 2019-02-14 RX ADMIN — Medication 100 MILLIGRAM(S): at 21:32

## 2019-02-14 RX ADMIN — OXYCODONE HYDROCHLORIDE 10 MILLIGRAM(S): 5 TABLET ORAL at 04:10

## 2019-02-14 RX ADMIN — Medication 1: at 13:08

## 2019-02-14 RX ADMIN — OXYCODONE HYDROCHLORIDE 10 MILLIGRAM(S): 5 TABLET ORAL at 09:51

## 2019-02-14 RX ADMIN — OXYCODONE HYDROCHLORIDE 10 MILLIGRAM(S): 5 TABLET ORAL at 05:10

## 2019-02-14 RX ADMIN — LOSARTAN POTASSIUM 50 MILLIGRAM(S): 100 TABLET, FILM COATED ORAL at 17:57

## 2019-02-14 RX ADMIN — Medication 3 MILLIGRAM(S): at 00:38

## 2019-02-14 RX ADMIN — Medication 1: at 07:28

## 2019-02-14 RX ADMIN — Medication 100 MILLIGRAM(S): at 05:26

## 2019-02-14 RX ADMIN — Medication 325 MILLIGRAM(S): at 17:57

## 2019-02-14 RX ADMIN — Medication 325 MILLIGRAM(S): at 05:26

## 2019-02-14 RX ADMIN — Medication 1: at 17:15

## 2019-02-14 RX ADMIN — GABAPENTIN 600 MILLIGRAM(S): 400 CAPSULE ORAL at 12:47

## 2019-02-14 RX ADMIN — Medication 2000 MILLIGRAM(S): at 02:51

## 2019-02-14 RX ADMIN — ATORVASTATIN CALCIUM 20 MILLIGRAM(S): 80 TABLET, FILM COATED ORAL at 21:32

## 2019-02-14 RX ADMIN — Medication 1 TABLET(S): at 12:47

## 2019-02-14 RX ADMIN — Medication 100 MILLIGRAM(S): at 14:42

## 2019-02-14 RX ADMIN — OXYCODONE HYDROCHLORIDE 10 MILLIGRAM(S): 5 TABLET ORAL at 08:51

## 2019-02-14 NOTE — BEHAVIORAL HEALTH ASSESSMENT NOTE - NSBHSOCIALHXDETAILSFT_PSY_A_CORE
Has 2 adult daughters, 1 son and 7 grandchildren.  passed away at age 59. She used to work in a nursery. she lives alone and has a HHA.

## 2019-02-14 NOTE — PROGRESS NOTE ADULT - SUBJECTIVE AND OBJECTIVE BOX
Orthopaedics Progress Note    Pt comfortable, without complaints  Denies CP, SOB, N/V, numbness/tingling     Vital Signs Last 24 Hrs  T(C): 35.7 (14 Feb 2019 04:12), Max: 36.5 (13 Feb 2019 14:02)  T(F): 96.3 (14 Feb 2019 04:12), Max: 97.7 (13 Feb 2019 14:02)  HR: 88 (14 Feb 2019 04:12) (66 - 88)  BP: 162/71 (14 Feb 2019 04:12) (115/56 - 162/71)  BP(mean): 89 (13 Feb 2019 15:06) (80 - 95)  RR: 17 (14 Feb 2019 04:12) (10 - 22)  SpO2: 100% (14 Feb 2019 04:12) (94% - 100%)  AVSS, NAD    Dressing C/D/I; BRENT  General: Pt Alert and oriented     Pulses: DP/PT pulses 2+  Sensation: SLT in tact and equal to distal bilateral lower extremities   Motor: EHL/FHL/TA/GS 5/5 motor strength bilaterally                           9.7    12.11 )-----------( 220      ( 13 Feb 2019 14:34 )             30.7       A/P: 78yFemale POD#1 s/p revision left TKR  - Stable  - Pain Control  - DVT ppx: ASA  - Post op abx: Ancef  - PT, WBS: WBAT  - F/U AM Labs

## 2019-02-14 NOTE — PROGRESS NOTE ADULT - SUBJECTIVE AND OBJECTIVE BOX
POST OPERATIVE DAY #: 1  STATUS POST: Left TKA Revision     SUBJECTIVE: Patient seen and examined. Pt. having episodes of depression, she states she really wants someone to talk to otherwise she gets unhappy and cries. Denies any sob/cp/n/v/ numbness or tingling in b/l les.       OBJECTIVE:     Vital Signs Last 24 Hrs  T(C): 35.7 (14 Feb 2019 04:12), Max: 36.4 (14 Feb 2019 00:06)  T(F): 96.3 (14 Feb 2019 04:12), Max: 97.6 (14 Feb 2019 00:06)  HR: 88 (14 Feb 2019 04:12) (66 - 88)  BP: 162/71 (14 Feb 2019 04:12) (119/61 - 162/71)  BP(mean): 89 (13 Feb 2019 15:06) (86 - 95)  RR: 17 (14 Feb 2019 04:12) (10 - 22)  SpO2: 100% (14 Feb 2019 04:12) (97% - 100%)    Affected extremity: left le          Dressing: clean/dry/intact          Sensation: intact to light touch to patient's baseline         Motor exam: EHL/TA/GS 5/5  Pulses 2+ b/l les             I&O's Detail    13 Feb 2019 07:01  -  14 Feb 2019 07:00  --------------------------------------------------------  IN:  Total IN: 0 mL    OUT:    Voided: 450 mL  Total OUT: 450 mL    Total NET: -450 mL          LABS:                        9.4    15.11 )-----------( 225      ( 14 Feb 2019 07:49 )             31.0     02-14    138  |  98  |  15  ----------------------------<  188<H>  4.4   |  27  |  0.84    Ca    9.6      14 Feb 2019 07:49        MEDICATIONS:    gabapentin 600 milliGRAM(s) Oral daily  ketorolac   Injectable 15 milliGRAM(s) IV Push every 6 hours  ondansetron Injectable 4 milliGRAM(s) IV Push every 6 hours PRN  oxyCODONE    IR 10 milliGRAM(s) Oral every 4 hours PRN    aspirin enteric coated 325 milliGRAM(s) Oral two times a day        ASSESSMENT AND PLAN: 79yo Female s/p REVISION LEFT TKR    1. Analgesic pain control  2. DVT prophylaxis: ASA        3. Weight Bearing Status:  Weight bearing as tolerated       4. Disposition: REHAB  5. Depression- pt. told nurse she was on psych meds but has stopped per her MDDamon made aware and psych consult called. Pt. encourged to call family but didn't want to. In the meantime was able to get PCA to keep her company when time permits. Will f/u on psych eval.

## 2019-02-14 NOTE — BEHAVIORAL HEALTH ASSESSMENT NOTE - SUMMARY
Patient is 78year old woman with Breast cancer, COPD, DM, HLD, HTN, Hypothyroidism, Lung cancer  left, 2015, Lung cancer  right 1980, Restless leg syndrome , Stroke and PPH of anxiety and depression presenting for elective left TKR revision. Psychiatry was consulted as the patient was noticed to be anxious and tearful. Patient currently presents with labile mood and high anxiety. She would benefit from starting an SSRi to help with her symptoms.  Plan:  -start Citalopram 10mg po daily for treatment of anxiety and depression;   -monitor the EKG for Qtc prolongation  -supportive psychotherapy Patient is 78year old woman with Breast cancer, COPD, DM, HLD, HTN, Hypothyroidism, Lung cancer  left, 2015, Lung cancer  right 1980, Restless leg syndrome , Stroke and PPH of anxiety and depression presenting for elective left TKR revision. Psychiatry was consulted as the patient was noticed to be anxious and tearful. Patient currently presents with labile mood and high anxiety. She would benefit from starting an SSRi to help with her symptoms.  Plan:  -start Citalopram 10mg po daily for treatment of anxiety and depression;   -start melatonin 3mg po qhs for insomnia  -monitor the EKG for Qtc prolongation  -supportive psychotherapy

## 2019-02-14 NOTE — BEHAVIORAL HEALTH ASSESSMENT NOTE - NSBHCHARTREVIEWLAB_PSY_A_CORE FT
CBC Full  -  ( 14 Feb 2019 07:49 )  WBC Count : 15.11 K/uL  Hemoglobin : 9.4 g/dL  Hematocrit : 31.0 %  Platelet Count - Automated : 225 K/uL  Mean Cell Volume : 96.9 fl  Mean Cell Hemoglobin : 29.4 pg  Mean Cell Hemoglobin Concentration : 30.3 gm/dL  Auto Neutrophil # : x  Auto Lymphocyte # : x  Auto Monocyte # : x  Auto Eosinophil # : x  Auto Basophil # : x  Auto Neutrophil % : x  Auto Lymphocyte % : x  Auto Monocyte % : x  Auto Eosinophil % : x  Auto Basophil % : x  02-14    138  |  98  |  15  ----------------------------<  188<H>  4.4   |  27  |  0.84    Ca    9.6      14 Feb 2019 07:49

## 2019-02-14 NOTE — BEHAVIORAL HEALTH ASSESSMENT NOTE - DIFFERENTIAL
Unspecified anxiety disorder vs Adjustment disorder with mixed anxiety and depression vs MDD with aniety

## 2019-02-14 NOTE — BEHAVIORAL HEALTH ASSESSMENT NOTE - HPI (INCLUDE ILLNESS QUALITY, SEVERITY, DURATION, TIMING, CONTEXT, MODIFYING FACTORS, ASSOCIATED SIGNS AND SYMPTOMS)
Patient is 78year old woman with Breast cancer, COPD, DM, HLD, HTN, Hypothyroidism, Lung cancer  left, 2015, Lung cancer  right 1980, Restless leg syndrome , Stroke and PPH of anxiety and depression presenting for elective left TKR revision. Psychiatry was consulted as the patient was noticed to be anxious and tearful. Upon interview patient reports that always held the fear of being alone and she feels very scared in the hospital. She also reports thinking that she might die during the surgery and not be able to attend her granddaughter's wedding. Patient endorses anxiety, sadness and memory loss prior to this admission. Collaterals were obtained from patient's daughter Roberto Carlos ([phone]) who reports that patient was diagnosed with depression and anxiety during the last two years. She was put on lexapro for 1 year but did not respond. she was switched then to remeron 15mg which was eventually stopped due to sedation. Patient hasn't taken any medications during the last 2-3 months. Patient is 78year old woman with Breast cancer, COPD, DM, HLD, HTN, Hypothyroidism, Lung cancer  left, 2015, Lung cancer  right 1980, Restless leg syndrome , Stroke and PPH of anxiety and depression presenting for elective left TKR revision. Psychiatry was consulted as the patient was noticed to be anxious and tearful. Upon interview patient reports that always held the fear of being alone and she feels very scared in the hospital. She also reports thinking that she might die during the surgery and not be able to attend her granddaughter's wedding. Patient endorses anxiety, sadness and memory loss prior to this admission. Collaterals were obtained from patient's daughter Roberto Carlos ([phone]) who reports that patient was diagnosed with depression and anxiety during the last two years. She was put on lexapro for 1 year but did not respond. she was switched then to remeron 15mg which was eventually stopped due to sedation. Patient hasn't taken any medications during the last 2-3 months. she takes melatonin at home for insomnia

## 2019-02-14 NOTE — BEHAVIORAL HEALTH ASSESSMENT NOTE - NSBHSUICPROTECTFACT_PSY_A_CORE
Identifies reasons for living/Supportive social network or family/Fear of death or dying due to pain/suffering/Ability to cope with stress/Future oriented/Responsibility to family and others

## 2019-02-14 NOTE — BEHAVIORAL HEALTH ASSESSMENT NOTE - NSBHCHARTREVIEWVS_PSY_A_CORE FT
Vital Signs Last 24 Hrs  T(C): 35.7 (14 Feb 2019 15:07), Max: 36.4 (14 Feb 2019 00:06)  T(F): 96.2 (14 Feb 2019 15:07), Max: 97.6 (14 Feb 2019 00:06)  HR: 82 (14 Feb 2019 15:07) (67 - 88)  BP: 131/58 (14 Feb 2019 15:07) (122/53 - 162/71)  BP(mean): --  RR: 15 (14 Feb 2019 15:07) (15 - 17)  SpO2: 99% (14 Feb 2019 15:07) (99% - 100%)

## 2019-02-15 LAB
GLUCOSE BLDC GLUCOMTR-MCNC: 144 MG/DL — HIGH (ref 70–99)
GLUCOSE BLDC GLUCOMTR-MCNC: 165 MG/DL — HIGH (ref 70–99)
GLUCOSE BLDC GLUCOMTR-MCNC: 190 MG/DL — HIGH (ref 70–99)
GLUCOSE BLDC GLUCOMTR-MCNC: 199 MG/DL — HIGH (ref 70–99)

## 2019-02-15 PROCEDURE — 99231 SBSQ HOSP IP/OBS SF/LOW 25: CPT

## 2019-02-15 RX ADMIN — Medication 1: at 22:00

## 2019-02-15 RX ADMIN — Medication 1: at 17:22

## 2019-02-15 RX ADMIN — OXYCODONE HYDROCHLORIDE 10 MILLIGRAM(S): 5 TABLET ORAL at 10:49

## 2019-02-15 RX ADMIN — POLYETHYLENE GLYCOL 3350 17 GRAM(S): 17 POWDER, FOR SOLUTION ORAL at 12:48

## 2019-02-15 RX ADMIN — OXYCODONE HYDROCHLORIDE 10 MILLIGRAM(S): 5 TABLET ORAL at 19:46

## 2019-02-15 RX ADMIN — Medication 15 MILLIGRAM(S): at 06:05

## 2019-02-15 RX ADMIN — Medication 325 MILLIGRAM(S): at 17:22

## 2019-02-15 RX ADMIN — Medication 975 MILLIGRAM(S): at 05:50

## 2019-02-15 RX ADMIN — OXYCODONE HYDROCHLORIDE 10 MILLIGRAM(S): 5 TABLET ORAL at 03:08

## 2019-02-15 RX ADMIN — CITALOPRAM 10 MILLIGRAM(S): 10 TABLET, FILM COATED ORAL at 12:47

## 2019-02-15 RX ADMIN — GABAPENTIN 600 MILLIGRAM(S): 400 CAPSULE ORAL at 12:47

## 2019-02-15 RX ADMIN — Medication 975 MILLIGRAM(S): at 01:45

## 2019-02-15 RX ADMIN — Medication 100 MILLIGRAM(S): at 21:40

## 2019-02-15 RX ADMIN — Medication 15 MILLIGRAM(S): at 00:15

## 2019-02-15 RX ADMIN — Medication 15 MILLIGRAM(S): at 00:00

## 2019-02-15 RX ADMIN — PANTOPRAZOLE SODIUM 40 MILLIGRAM(S): 20 TABLET, DELAYED RELEASE ORAL at 05:50

## 2019-02-15 RX ADMIN — Medication 975 MILLIGRAM(S): at 18:45

## 2019-02-15 RX ADMIN — Medication 15 MILLIGRAM(S): at 05:50

## 2019-02-15 RX ADMIN — Medication 75 MICROGRAM(S): at 05:50

## 2019-02-15 RX ADMIN — Medication 1 TABLET(S): at 12:47

## 2019-02-15 RX ADMIN — Medication 100 MILLIGRAM(S): at 14:42

## 2019-02-15 RX ADMIN — OXYCODONE HYDROCHLORIDE 10 MILLIGRAM(S): 5 TABLET ORAL at 09:49

## 2019-02-15 RX ADMIN — Medication 100 MILLIGRAM(S): at 05:50

## 2019-02-15 RX ADMIN — LOSARTAN POTASSIUM 50 MILLIGRAM(S): 100 TABLET, FILM COATED ORAL at 12:44

## 2019-02-15 RX ADMIN — ATORVASTATIN CALCIUM 20 MILLIGRAM(S): 80 TABLET, FILM COATED ORAL at 21:40

## 2019-02-15 RX ADMIN — OXYCODONE HYDROCHLORIDE 10 MILLIGRAM(S): 5 TABLET ORAL at 02:08

## 2019-02-15 RX ADMIN — Medication 1: at 07:45

## 2019-02-15 RX ADMIN — Medication 975 MILLIGRAM(S): at 17:45

## 2019-02-15 RX ADMIN — Medication 325 MILLIGRAM(S): at 05:50

## 2019-02-15 RX ADMIN — Medication 975 MILLIGRAM(S): at 06:50

## 2019-02-15 RX ADMIN — Medication 975 MILLIGRAM(S): at 00:45

## 2019-02-15 NOTE — PROGRESS NOTE BEHAVIORAL HEALTH - SUMMARY
Patient is 78year old woman with Breast cancer, COPD, DM, HLD, HTN, Hypothyroidism, Lung cancer  left, 2015, Lung cancer  right 1980, Restless leg syndrome , Stroke and PPH of anxiety and depression presenting for elective left TKR revision. Psychiatry was consulted as the patient was noticed to be anxious and tearful. Patient currently presents with labile mood and high anxiety. She was started on a SSRI.  Plan:  -continue Citalopram 10mg po daily for treatment of anxiety and depression;   -continue melatonin 3mg po qhs for insomnia  -monitor the EKG for Qtc prolongation  -supportive psychotherapy Patient is 78year old woman with Breast cancer, COPD, DM, HLD, HTN, Hypothyroidism, Lung cancer  left, 2015, Lung cancer  right 1980, Restless leg syndrome , Stroke and PPH of anxiety and depression presenting for elective left TKR revision. Psychiatry was consulted as the patient was noticed to be anxious and tearful. Patient currently presents with labile mood and high anxiety. She was started on a SSRI.  Plan:  -please obtain an EKG to monitor for Qtc prolongation after starting citalopram  -continue Citalopram 10mg po daily for treatment of anxiety and depression;   -continue melatonin 3mg po qhs for insomnia  -supportive psychotherapy

## 2019-02-15 NOTE — PROGRESS NOTE BEHAVIORAL HEALTH - NSBHCONSFOLLOWNEEDS_PSY_A_CORE
Problem: Impaired Functional Mobility  Goal: Achieve highest/safest level of mobility/gait  Interventions:  - Assess patient's functional ability and stability  - Promote increasing activity/tolerance for mobility and gait  - Educate and engage patient/fam no psychiatric contraindications to discharge

## 2019-02-15 NOTE — PROGRESS NOTE ADULT - SUBJECTIVE AND OBJECTIVE BOX
POST OPERATIVE DAY #: 2  STATUS POST: Revision Left TKA                   SUBJECTIVE: Patient seen and examined. Pt. seems a bit happier today, family will be coming later on.   Denies any sob/cp/n/v/numbness or tingling in b/l les.     OBJECTIVE:     Vital Signs Last 24 Hrs  T(C): 35.9 (15 Feb 2019 08:40), Max: 36.8 (14 Feb 2019 20:46)  T(F): 96.6 (15 Feb 2019 08:40), Max: 98.2 (14 Feb 2019 20:46)  HR: 76 (15 Feb 2019 12:45) (66 - 82)  BP: 130/71 (15 Feb 2019 12:45) (110/63 - 149/73)  BP(mean): --  RR: 15 (15 Feb 2019 08:40) (15 - 16)  SpO2: 99% (15 Feb 2019 08:40) (96% - 99%)    Affected extremity:  left le          Dressing: clean/dry/intact         Sensation: intact to light touch to patient's baseline         Motor exam: EHL/TA/GS 5/5  Pulses 2+ b/l les             I&O's Detail    14 Feb 2019 07:01  -  15 Feb 2019 07:00  --------------------------------------------------------  IN:  Total IN: 0 mL    OUT:    Voided: 550 mL  Total OUT: 550 mL    Total NET: -550 mL          LABS:                        9.4    15.11 )-----------( 225      ( 14 Feb 2019 07:49 )             31.0     02-14    138  |  98  |  15  ----------------------------<  188<H>  4.4   |  27  |  0.84    Ca    9.6      14 Feb 2019 07:49      MEDICATIONS:    acetaminophen   Tablet .. 975 milliGRAM(s) Oral every 4 hours PRN  citalopram 10 milliGRAM(s) Oral daily  gabapentin 600 milliGRAM(s) Oral daily  melatonin 3 milliGRAM(s) Oral at bedtime PRN  ondansetron Injectable 4 milliGRAM(s) IV Push every 6 hours PRN  oxyCODONE    IR 10 milliGRAM(s) Oral every 4 hours PRN    aspirin enteric coated 325 milliGRAM(s) Oral two times a day        ASSESSMENT AND PLAN: 77yo Female s/p REVISION LEFT TKR     1. Analgesic pain control  2. DVT prophylaxis: ASA       3. Weight Bearing Status:  Weight bearing as tolerated         4. Disposition: Subacute Rehab likely after Monday, Dr. Sousa would like to keep patient inhouse for a few more days 2/2 to knee dislocated in rehab  5. Depression- continue psych medications

## 2019-02-15 NOTE — PROGRESS NOTE BEHAVIORAL HEALTH - NSBHADMITCOUNSEL_PSY_A_CORE
diagnostic results/impressions and/or recommended studies/importance of adherence to chosen treatment/risks and benefits of treatment options/risk factor reduction

## 2019-02-15 NOTE — PROGRESS NOTE ADULT - SUBJECTIVE AND OBJECTIVE BOX
Ortho post op note    SUBJECTIVE: Patient seen and examined. Pt. having episodes of depression, she states she really wants someone to talk to otherwise she gets unhappy and cries. Denies any sob/cp/n/v/ numbness or tingling in b/l les.       OBJECTIVE:     Vital Signs Last 24 Hrs  T(C): 35.8 (15 Feb 2019 04:54), Max: 36.8 (14 Feb 2019 20:46)  T(F): 96.4 (15 Feb 2019 04:54), Max: 98.2 (14 Feb 2019 20:46)  HR: 71 (15 Feb 2019 04:54) (71 - 82)  BP: 110/63 (15 Feb 2019 04:54) (110/63 - 149/73)  BP(mean): --  RR: 16 (15 Feb 2019 04:54) (15 - 16)  SpO2: 98% (15 Feb 2019 04:54) (96% - 99%)  Affected extremity: left le          Dressing: clean/dry/intact          Sensation: intact to light touch to patient's baseline         Motor exam: EHL/TA/GS 5/5  Pulses 2+ b/l les             I&O's Detail    13 Feb 2019 07:01  -  14 Feb 2019 07:00  --------------------------------------------------------  IN:  Total IN: 0 mL    OUT:    Voided: 450 mL  Total OUT: 450 mL    Total NET: -450 mL          LABS:                        9.4    15.11 )-----------( 225      ( 14 Feb 2019 07:49 )             31.0     02-14    138  |  98  |  15  ----------------------------<  188<H>  4.4   |  27  |  0.84    Ca    9.6      14 Feb 2019 07:49            ASSESSMENT AND PLAN: 77yo Female s/p REVISION LEFT TKR    1. Analgesic pain control  2. DVT prophylaxis: ASA        3. Weight Bearing Status:  Weight bearing as tolerated       4. Disposition: REHAB  5. Depression- pt. told nurse she was on psych meds but has stopped per her MD, Damon made aware and psych consult called. Pt. encourged to call family but didn't want to. In the meantime was able to get PCA to keep her company when time permits.

## 2019-02-15 NOTE — PROGRESS NOTE BEHAVIORAL HEALTH - NSBHCHARTREVIEWVS_PSY_A_CORE FT
ICU Vital Signs Last 24 Hrs  T(C): 35.9 (15 Feb 2019 08:40), Max: 36.8 (14 Feb 2019 20:46)  T(F): 96.6 (15 Feb 2019 08:40), Max: 98.2 (14 Feb 2019 20:46)  HR: 66 (15 Feb 2019 08:40) (66 - 82)  BP: 122/60 (15 Feb 2019 08:40) (110/63 - 149/73)  BP(mean): --  ABP: --  ABP(mean): --  RR: 15 (15 Feb 2019 08:40) (15 - 16)  SpO2: 99% (15 Feb 2019 08:40) (96% - 99%)

## 2019-02-16 LAB
ANION GAP SERPL CALC-SCNC: 12 MMOL/L — SIGNIFICANT CHANGE UP (ref 5–17)
BUN SERPL-MCNC: 17 MG/DL — SIGNIFICANT CHANGE UP (ref 7–23)
CALCIUM SERPL-MCNC: 9 MG/DL — SIGNIFICANT CHANGE UP (ref 8.4–10.5)
CHLORIDE SERPL-SCNC: 97 MMOL/L — SIGNIFICANT CHANGE UP (ref 96–108)
CO2 SERPL-SCNC: 24 MMOL/L — SIGNIFICANT CHANGE UP (ref 22–31)
CREAT SERPL-MCNC: 0.79 MG/DL — SIGNIFICANT CHANGE UP (ref 0.5–1.3)
GLUCOSE BLDC GLUCOMTR-MCNC: 160 MG/DL — HIGH (ref 70–99)
GLUCOSE BLDC GLUCOMTR-MCNC: 165 MG/DL — HIGH (ref 70–99)
GLUCOSE BLDC GLUCOMTR-MCNC: 174 MG/DL — HIGH (ref 70–99)
GLUCOSE BLDC GLUCOMTR-MCNC: 220 MG/DL — HIGH (ref 70–99)
GLUCOSE SERPL-MCNC: 162 MG/DL — HIGH (ref 70–99)
HCT VFR BLD CALC: 30.1 % — LOW (ref 34.5–45)
HGB BLD-MCNC: 9.4 G/DL — LOW (ref 11.5–15.5)
MCHC RBC-ENTMCNC: 29.7 PG — SIGNIFICANT CHANGE UP (ref 27–34)
MCHC RBC-ENTMCNC: 31.2 GM/DL — LOW (ref 32–36)
MCV RBC AUTO: 95 FL — SIGNIFICANT CHANGE UP (ref 80–100)
NRBC # BLD: 0 /100 WBCS — SIGNIFICANT CHANGE UP (ref 0–0)
PLATELET # BLD AUTO: 200 K/UL — SIGNIFICANT CHANGE UP (ref 150–400)
POTASSIUM SERPL-MCNC: 4.4 MMOL/L — SIGNIFICANT CHANGE UP (ref 3.5–5.3)
POTASSIUM SERPL-SCNC: 4.4 MMOL/L — SIGNIFICANT CHANGE UP (ref 3.5–5.3)
RBC # BLD: 3.17 M/UL — LOW (ref 3.8–5.2)
RBC # FLD: 13.6 % — SIGNIFICANT CHANGE UP (ref 10.3–14.5)
SODIUM SERPL-SCNC: 133 MMOL/L — LOW (ref 135–145)
WBC # BLD: 12.85 K/UL — HIGH (ref 3.8–10.5)
WBC # FLD AUTO: 12.85 K/UL — HIGH (ref 3.8–10.5)

## 2019-02-16 RX ORDER — OXYCODONE HYDROCHLORIDE 5 MG/1
10 TABLET ORAL EVERY 4 HOURS
Qty: 0 | Refills: 0 | Status: DISCONTINUED | OUTPATIENT
Start: 2019-02-16 | End: 2019-02-19

## 2019-02-16 RX ORDER — OXYCODONE HYDROCHLORIDE 5 MG/1
5 TABLET ORAL EVERY 4 HOURS
Qty: 0 | Refills: 0 | Status: DISCONTINUED | OUTPATIENT
Start: 2019-02-16 | End: 2019-02-19

## 2019-02-16 RX ADMIN — GABAPENTIN 600 MILLIGRAM(S): 400 CAPSULE ORAL at 09:29

## 2019-02-16 RX ADMIN — Medication 1: at 07:30

## 2019-02-16 RX ADMIN — OXYCODONE HYDROCHLORIDE 5 MILLIGRAM(S): 5 TABLET ORAL at 11:55

## 2019-02-16 RX ADMIN — OXYCODONE HYDROCHLORIDE 5 MILLIGRAM(S): 5 TABLET ORAL at 12:45

## 2019-02-16 RX ADMIN — Medication 100 MILLIGRAM(S): at 05:51

## 2019-02-16 RX ADMIN — Medication 325 MILLIGRAM(S): at 17:39

## 2019-02-16 RX ADMIN — Medication 1: at 21:59

## 2019-02-16 RX ADMIN — LOSARTAN POTASSIUM 50 MILLIGRAM(S): 100 TABLET, FILM COATED ORAL at 05:51

## 2019-02-16 RX ADMIN — OXYCODONE HYDROCHLORIDE 5 MILLIGRAM(S): 5 TABLET ORAL at 06:58

## 2019-02-16 RX ADMIN — Medication 2: at 11:57

## 2019-02-16 RX ADMIN — CITALOPRAM 10 MILLIGRAM(S): 10 TABLET, FILM COATED ORAL at 14:24

## 2019-02-16 RX ADMIN — Medication 325 MILLIGRAM(S): at 05:51

## 2019-02-16 RX ADMIN — PANTOPRAZOLE SODIUM 40 MILLIGRAM(S): 20 TABLET, DELAYED RELEASE ORAL at 05:51

## 2019-02-16 RX ADMIN — Medication 1: at 17:39

## 2019-02-16 RX ADMIN — LOSARTAN POTASSIUM 50 MILLIGRAM(S): 100 TABLET, FILM COATED ORAL at 17:39

## 2019-02-16 RX ADMIN — ATORVASTATIN CALCIUM 20 MILLIGRAM(S): 80 TABLET, FILM COATED ORAL at 21:28

## 2019-02-16 RX ADMIN — Medication 100 MILLIGRAM(S): at 21:28

## 2019-02-16 RX ADMIN — Medication 75 MICROGRAM(S): at 06:43

## 2019-02-16 RX ADMIN — Medication 1 TABLET(S): at 09:29

## 2019-02-16 RX ADMIN — OXYCODONE HYDROCHLORIDE 5 MILLIGRAM(S): 5 TABLET ORAL at 05:58

## 2019-02-16 RX ADMIN — POLYETHYLENE GLYCOL 3350 17 GRAM(S): 17 POWDER, FOR SOLUTION ORAL at 09:30

## 2019-02-16 RX ADMIN — Medication 100 MILLIGRAM(S): at 14:27

## 2019-02-16 NOTE — PROGRESS NOTE ADULT - SUBJECTIVE AND OBJECTIVE BOX
Orthopedics              SUBJECTIVE: Patient seen and examined. Pain controlled overnight. Denies any sob/cp/n/v/numbness or tingling in b/l les.     OBJECTIVE:     Vital Signs Last 24 Hrs  T(C): 36.7 (16 Feb 2019 04:57), Max: 36.9 (15 Feb 2019 15:53)  T(F): 98 (16 Feb 2019 04:57), Max: 98.4 (15 Feb 2019 15:53)  HR: 72 (16 Feb 2019 04:57) (68 - 76)  BP: 137/74 (16 Feb 2019 04:57) (118/61 - 154/70)  BP(mean): --  RR: 17 (16 Feb 2019 04:57) (16 - 17)  SpO2: 96% (16 Feb 2019 04:57) (95% - 100%)    Affected extremity:  left le          Dressing: clean/dry/intact         Sensation: intact to light touch to patient's baseline         Motor exam: EHL/TA/GS 5/5  Pulses 2+ b/l les             ASSESSMENT AND PLAN: 79yo Female s/p REVISION LEFT TKR     1. Analgesic pain control  2. DVT prophylaxis: ASA       3. Weight Bearing Status:  Weight bearing as tolerated         4. Disposition: Subacute Rehab likely after Monday, Dr. Sousa would like to keep patient inhouse for a few more days 2/2 to knee dislocated in rehab  5. Depression- continue psych medications

## 2019-02-17 LAB
ANION GAP SERPL CALC-SCNC: 9 MMOL/L — SIGNIFICANT CHANGE UP (ref 5–17)
BUN SERPL-MCNC: 18 MG/DL — SIGNIFICANT CHANGE UP (ref 7–23)
CALCIUM SERPL-MCNC: 9 MG/DL — SIGNIFICANT CHANGE UP (ref 8.4–10.5)
CHLORIDE SERPL-SCNC: 99 MMOL/L — SIGNIFICANT CHANGE UP (ref 96–108)
CO2 SERPL-SCNC: 27 MMOL/L — SIGNIFICANT CHANGE UP (ref 22–31)
CREAT SERPL-MCNC: 0.9 MG/DL — SIGNIFICANT CHANGE UP (ref 0.5–1.3)
GLUCOSE BLDC GLUCOMTR-MCNC: 165 MG/DL — HIGH (ref 70–99)
GLUCOSE BLDC GLUCOMTR-MCNC: 182 MG/DL — HIGH (ref 70–99)
GLUCOSE BLDC GLUCOMTR-MCNC: 183 MG/DL — HIGH (ref 70–99)
GLUCOSE BLDC GLUCOMTR-MCNC: 210 MG/DL — HIGH (ref 70–99)
GLUCOSE SERPL-MCNC: 171 MG/DL — HIGH (ref 70–99)
HCT VFR BLD CALC: 26.4 % — LOW (ref 34.5–45)
HGB BLD-MCNC: 8.2 G/DL — LOW (ref 11.5–15.5)
MCHC RBC-ENTMCNC: 29.4 PG — SIGNIFICANT CHANGE UP (ref 27–34)
MCHC RBC-ENTMCNC: 31.1 GM/DL — LOW (ref 32–36)
MCV RBC AUTO: 94.6 FL — SIGNIFICANT CHANGE UP (ref 80–100)
NRBC # BLD: 0 /100 WBCS — SIGNIFICANT CHANGE UP (ref 0–0)
PLATELET # BLD AUTO: 218 K/UL — SIGNIFICANT CHANGE UP (ref 150–400)
POTASSIUM SERPL-MCNC: 4.6 MMOL/L — SIGNIFICANT CHANGE UP (ref 3.5–5.3)
POTASSIUM SERPL-SCNC: 4.6 MMOL/L — SIGNIFICANT CHANGE UP (ref 3.5–5.3)
RBC # BLD: 2.79 M/UL — LOW (ref 3.8–5.2)
RBC # FLD: 13.7 % — SIGNIFICANT CHANGE UP (ref 10.3–14.5)
SODIUM SERPL-SCNC: 135 MMOL/L — SIGNIFICANT CHANGE UP (ref 135–145)
WBC # BLD: 12.81 K/UL — HIGH (ref 3.8–10.5)
WBC # FLD AUTO: 12.81 K/UL — HIGH (ref 3.8–10.5)

## 2019-02-17 RX ADMIN — Medication 2: at 17:20

## 2019-02-17 RX ADMIN — Medication 100 MILLIGRAM(S): at 05:31

## 2019-02-17 RX ADMIN — POLYETHYLENE GLYCOL 3350 17 GRAM(S): 17 POWDER, FOR SOLUTION ORAL at 09:25

## 2019-02-17 RX ADMIN — PANTOPRAZOLE SODIUM 40 MILLIGRAM(S): 20 TABLET, DELAYED RELEASE ORAL at 05:31

## 2019-02-17 RX ADMIN — LOSARTAN POTASSIUM 50 MILLIGRAM(S): 100 TABLET, FILM COATED ORAL at 17:20

## 2019-02-17 RX ADMIN — OXYCODONE HYDROCHLORIDE 10 MILLIGRAM(S): 5 TABLET ORAL at 22:44

## 2019-02-17 RX ADMIN — Medication 1: at 22:04

## 2019-02-17 RX ADMIN — Medication 325 MILLIGRAM(S): at 17:20

## 2019-02-17 RX ADMIN — OXYCODONE HYDROCHLORIDE 10 MILLIGRAM(S): 5 TABLET ORAL at 23:44

## 2019-02-17 RX ADMIN — Medication 100 MILLIGRAM(S): at 14:28

## 2019-02-17 RX ADMIN — GABAPENTIN 600 MILLIGRAM(S): 400 CAPSULE ORAL at 09:24

## 2019-02-17 RX ADMIN — OXYCODONE HYDROCHLORIDE 10 MILLIGRAM(S): 5 TABLET ORAL at 15:00

## 2019-02-17 RX ADMIN — Medication 100 MILLIGRAM(S): at 21:34

## 2019-02-17 RX ADMIN — CITALOPRAM 10 MILLIGRAM(S): 10 TABLET, FILM COATED ORAL at 09:25

## 2019-02-17 RX ADMIN — Medication 325 MILLIGRAM(S): at 05:31

## 2019-02-17 RX ADMIN — OXYCODONE HYDROCHLORIDE 5 MILLIGRAM(S): 5 TABLET ORAL at 04:10

## 2019-02-17 RX ADMIN — OXYCODONE HYDROCHLORIDE 10 MILLIGRAM(S): 5 TABLET ORAL at 15:45

## 2019-02-17 RX ADMIN — OXYCODONE HYDROCHLORIDE 5 MILLIGRAM(S): 5 TABLET ORAL at 03:10

## 2019-02-17 RX ADMIN — Medication 1 TABLET(S): at 09:25

## 2019-02-17 RX ADMIN — Medication 1: at 11:55

## 2019-02-17 RX ADMIN — LOSARTAN POTASSIUM 50 MILLIGRAM(S): 100 TABLET, FILM COATED ORAL at 05:31

## 2019-02-17 RX ADMIN — Medication 75 MICROGRAM(S): at 06:22

## 2019-02-17 RX ADMIN — ATORVASTATIN CALCIUM 20 MILLIGRAM(S): 80 TABLET, FILM COATED ORAL at 21:34

## 2019-02-17 RX ADMIN — Medication 1: at 07:19

## 2019-02-17 NOTE — PROGRESS NOTE ADULT - SUBJECTIVE AND OBJECTIVE BOX
Orthopedics              SUBJECTIVE: Patient seen and examined. Pain controlled overnight. Denies any sob/cp/n/v/numbness or tingling in b/l les.   Plan for SCOUT Mon/Tues    OBJECTIVE:     Vital Signs Last 24 Hrs  T(C): 37 (17 Feb 2019 04:53), Max: 37.5 (16 Feb 2019 20:20)  T(F): 98.6 (17 Feb 2019 04:53), Max: 99.5 (16 Feb 2019 20:20)  HR: 80 (17 Feb 2019 04:53) (63 - 80)  BP: 135/74 (17 Feb 2019 04:53) (97/55 - 147/70)  BP(mean): --  RR: 17 (17 Feb 2019 04:53) (16 - 18)  SpO2: 96% (17 Feb 2019 04:53) (96% - 98%)    Affected extremity:  left le          Dressing: clean/dry/intact         Sensation: intact to light touch to patient's baseline         Motor exam: EHL/TA/GS 5/5  Pulses 2+ b/l les             ASSESSMENT AND PLAN: 79yo Female s/p REVISION LEFT TKR     1. Analgesic pain control  2. DVT prophylaxis: ASA       3. Weight Bearing Status:  Weight bearing as tolerated         4. Disposition: Subacute Rehab likely after Monday, Dr. Sousa would like to keep patient inhouse for a few more days 2/2 to knee dislocated in rehab  5. Depression- continue psych medications

## 2019-02-18 LAB
ANION GAP SERPL CALC-SCNC: 9 MMOL/L — SIGNIFICANT CHANGE UP (ref 5–17)
BUN SERPL-MCNC: 16 MG/DL — SIGNIFICANT CHANGE UP (ref 7–23)
CALCIUM SERPL-MCNC: 9.2 MG/DL — SIGNIFICANT CHANGE UP (ref 8.4–10.5)
CHLORIDE SERPL-SCNC: 96 MMOL/L — SIGNIFICANT CHANGE UP (ref 96–108)
CO2 SERPL-SCNC: 27 MMOL/L — SIGNIFICANT CHANGE UP (ref 22–31)
CREAT SERPL-MCNC: 0.86 MG/DL — SIGNIFICANT CHANGE UP (ref 0.5–1.3)
GLUCOSE BLDC GLUCOMTR-MCNC: 175 MG/DL — HIGH (ref 70–99)
GLUCOSE BLDC GLUCOMTR-MCNC: 189 MG/DL — HIGH (ref 70–99)
GLUCOSE BLDC GLUCOMTR-MCNC: 216 MG/DL — HIGH (ref 70–99)
GLUCOSE BLDC GLUCOMTR-MCNC: 225 MG/DL — HIGH (ref 70–99)
GLUCOSE SERPL-MCNC: 170 MG/DL — HIGH (ref 70–99)
HCT VFR BLD CALC: 28.1 % — LOW (ref 34.5–45)
HGB BLD-MCNC: 8.8 G/DL — LOW (ref 11.5–15.5)
MCHC RBC-ENTMCNC: 29.6 PG — SIGNIFICANT CHANGE UP (ref 27–34)
MCHC RBC-ENTMCNC: 31.3 GM/DL — LOW (ref 32–36)
MCV RBC AUTO: 94.6 FL — SIGNIFICANT CHANGE UP (ref 80–100)
NRBC # BLD: 0 /100 WBCS — SIGNIFICANT CHANGE UP (ref 0–0)
PLATELET # BLD AUTO: 246 K/UL — SIGNIFICANT CHANGE UP (ref 150–400)
POTASSIUM SERPL-MCNC: 4.7 MMOL/L — SIGNIFICANT CHANGE UP (ref 3.5–5.3)
POTASSIUM SERPL-SCNC: 4.7 MMOL/L — SIGNIFICANT CHANGE UP (ref 3.5–5.3)
RBC # BLD: 2.97 M/UL — LOW (ref 3.8–5.2)
RBC # FLD: 13.8 % — SIGNIFICANT CHANGE UP (ref 10.3–14.5)
SODIUM SERPL-SCNC: 132 MMOL/L — LOW (ref 135–145)
SURGICAL PATHOLOGY STUDY: SIGNIFICANT CHANGE UP
WBC # BLD: 11.57 K/UL — HIGH (ref 3.8–10.5)
WBC # FLD AUTO: 11.57 K/UL — HIGH (ref 3.8–10.5)

## 2019-02-18 RX ADMIN — Medication 1: at 07:32

## 2019-02-18 RX ADMIN — OXYCODONE HYDROCHLORIDE 10 MILLIGRAM(S): 5 TABLET ORAL at 11:28

## 2019-02-18 RX ADMIN — OXYCODONE HYDROCHLORIDE 10 MILLIGRAM(S): 5 TABLET ORAL at 10:28

## 2019-02-18 RX ADMIN — Medication 325 MILLIGRAM(S): at 17:08

## 2019-02-18 RX ADMIN — PANTOPRAZOLE SODIUM 40 MILLIGRAM(S): 20 TABLET, DELAYED RELEASE ORAL at 05:40

## 2019-02-18 RX ADMIN — Medication 975 MILLIGRAM(S): at 14:50

## 2019-02-18 RX ADMIN — Medication 1: at 17:08

## 2019-02-18 RX ADMIN — ATORVASTATIN CALCIUM 20 MILLIGRAM(S): 80 TABLET, FILM COATED ORAL at 21:19

## 2019-02-18 RX ADMIN — Medication 975 MILLIGRAM(S): at 23:23

## 2019-02-18 RX ADMIN — Medication 325 MILLIGRAM(S): at 05:40

## 2019-02-18 RX ADMIN — CITALOPRAM 10 MILLIGRAM(S): 10 TABLET, FILM COATED ORAL at 10:29

## 2019-02-18 RX ADMIN — Medication 2: at 12:16

## 2019-02-18 RX ADMIN — Medication 75 MICROGRAM(S): at 04:47

## 2019-02-18 RX ADMIN — LOSARTAN POTASSIUM 50 MILLIGRAM(S): 100 TABLET, FILM COATED ORAL at 05:40

## 2019-02-18 RX ADMIN — Medication 975 MILLIGRAM(S): at 15:50

## 2019-02-18 RX ADMIN — Medication 100 MILLIGRAM(S): at 14:50

## 2019-02-18 RX ADMIN — Medication 2: at 22:09

## 2019-02-18 RX ADMIN — Medication 100 MILLIGRAM(S): at 21:19

## 2019-02-18 RX ADMIN — Medication 1 TABLET(S): at 10:29

## 2019-02-18 RX ADMIN — Medication 100 MILLIGRAM(S): at 05:40

## 2019-02-18 RX ADMIN — POLYETHYLENE GLYCOL 3350 17 GRAM(S): 17 POWDER, FOR SOLUTION ORAL at 10:29

## 2019-02-18 RX ADMIN — GABAPENTIN 600 MILLIGRAM(S): 400 CAPSULE ORAL at 10:29

## 2019-02-18 NOTE — PROGRESS NOTE ADULT - SUBJECTIVE AND OBJECTIVE BOX
Orthopedics              SUBJECTIVE: Patient seen and examined. Pain controlled overnight. Denies any sob/cp/n/v/numbness or tingling in b/l les.   Plan for SCOUT Mon/Tues    OBJECTIVE:     Vital Signs Last 24 Hrs  T(C): 36.1 (18 Feb 2019 04:50), Max: 37.2 (17 Feb 2019 09:22)  T(F): 97 (18 Feb 2019 04:50), Max: 99 (17 Feb 2019 09:22)  HR: 71 (18 Feb 2019 04:50) (66 - 83)  BP: 152/66 (18 Feb 2019 04:50) (96/58 - 152/66)  BP(mean): --  RR: 18 (18 Feb 2019 04:50) (17 - 18)  SpO2: 96% (18 Feb 2019 04:50) (95% - 97%)    Affected extremity:  left le          Dressing: clean/dry/intact         Sensation: intact to light touch to patient's baseline         Motor exam: EHL/TA/GS 5/5  Pulses 2+ b/l les             ASSESSMENT AND PLAN: 79yo Female s/p REVISION LEFT TKR     1. Analgesic pain control  2. DVT prophylaxis: ASA       3. Weight Bearing Status:  Weight bearing as tolerated         4. Disposition: Subacute Rehab likely after Monday, Dr. Sousa would like to keep patient inhouse for a few more days 2/2 to knee dislocated in rehab  5. Depression- continue psych medications

## 2019-02-18 NOTE — DIETITIAN INITIAL EVALUATION ADULT. - OTHER INFO
79yo Female s/p revision of L TKR POD5. Pt seen resting in bed. Currently on a CSTCHO diet and tolerating POD. Endorsing poor to fair appetite. Consumed bread and coffee at breakfast- reports this is usual amount for her. Denies N/V. C/o constipation- on bowel regimen. Encouraged fluids and high fiber foods. Understanding of purpose of CSTCHO diet. Does not adhere to any particular diets at rehab that she knows of. No known wt changes PTA. NKFA or dietary restrictions. Skin: surgical incision; GI constipation per flowsheet.

## 2019-02-18 NOTE — DIETITIAN INITIAL EVALUATION ADULT. - ENERGY NEEDS
Height: 5'2" Weight: 195lbs, IBW 110lbs+/-10%, %%, BMI 35.7  IBW used for calculations as pt >120% of IBW   Nutrient needs based on [facility] standards of care for maintenance in older adults.   Needs adjusted for age and post-op healing.

## 2019-02-19 ENCOUNTER — TRANSCRIPTION ENCOUNTER (OUTPATIENT)
Age: 79
End: 2019-02-19

## 2019-02-19 VITALS
OXYGEN SATURATION: 96 % | RESPIRATION RATE: 17 BRPM | SYSTOLIC BLOOD PRESSURE: 149 MMHG | HEART RATE: 64 BPM | TEMPERATURE: 99 F | DIASTOLIC BLOOD PRESSURE: 66 MMHG

## 2019-02-19 LAB
ANION GAP SERPL CALC-SCNC: 12 MMOL/L — SIGNIFICANT CHANGE UP (ref 5–17)
BUN SERPL-MCNC: 14 MG/DL — SIGNIFICANT CHANGE UP (ref 7–23)
CALCIUM SERPL-MCNC: 9.3 MG/DL — SIGNIFICANT CHANGE UP (ref 8.4–10.5)
CHLORIDE SERPL-SCNC: 99 MMOL/L — SIGNIFICANT CHANGE UP (ref 96–108)
CO2 SERPL-SCNC: 26 MMOL/L — SIGNIFICANT CHANGE UP (ref 22–31)
CREAT SERPL-MCNC: 0.79 MG/DL — SIGNIFICANT CHANGE UP (ref 0.5–1.3)
GLUCOSE BLDC GLUCOMTR-MCNC: 163 MG/DL — HIGH (ref 70–99)
GLUCOSE BLDC GLUCOMTR-MCNC: 187 MG/DL — HIGH (ref 70–99)
GLUCOSE SERPL-MCNC: 159 MG/DL — HIGH (ref 70–99)
HCT VFR BLD CALC: 28.8 % — LOW (ref 34.5–45)
HGB BLD-MCNC: 8.8 G/DL — LOW (ref 11.5–15.5)
MCHC RBC-ENTMCNC: 29.4 PG — SIGNIFICANT CHANGE UP (ref 27–34)
MCHC RBC-ENTMCNC: 30.6 GM/DL — LOW (ref 32–36)
MCV RBC AUTO: 96.3 FL — SIGNIFICANT CHANGE UP (ref 80–100)
NRBC # BLD: 0 /100 WBCS — SIGNIFICANT CHANGE UP (ref 0–0)
PLATELET # BLD AUTO: 264 K/UL — SIGNIFICANT CHANGE UP (ref 150–400)
POTASSIUM SERPL-MCNC: 4.2 MMOL/L — SIGNIFICANT CHANGE UP (ref 3.5–5.3)
POTASSIUM SERPL-SCNC: 4.2 MMOL/L — SIGNIFICANT CHANGE UP (ref 3.5–5.3)
RBC # BLD: 2.99 M/UL — LOW (ref 3.8–5.2)
RBC # FLD: 13.6 % — SIGNIFICANT CHANGE UP (ref 10.3–14.5)
SODIUM SERPL-SCNC: 137 MMOL/L — SIGNIFICANT CHANGE UP (ref 135–145)
WBC # BLD: 9.11 K/UL — SIGNIFICANT CHANGE UP (ref 3.8–10.5)
WBC # FLD AUTO: 9.11 K/UL — SIGNIFICANT CHANGE UP (ref 3.8–10.5)

## 2019-02-19 RX ORDER — CITALOPRAM 10 MG/1
1 TABLET, FILM COATED ORAL
Qty: 0 | Refills: 0 | COMMUNITY
Start: 2019-02-19

## 2019-02-19 RX ORDER — GABAPENTIN 400 MG/1
2 CAPSULE ORAL
Qty: 0 | Refills: 0 | COMMUNITY
Start: 2019-02-19

## 2019-02-19 RX ORDER — SENNA PLUS 8.6 MG/1
2 TABLET ORAL
Qty: 0 | Refills: 0 | COMMUNITY
Start: 2019-02-19

## 2019-02-19 RX ORDER — OXYCODONE HYDROCHLORIDE 5 MG/1
1 TABLET ORAL
Qty: 0 | Refills: 0 | COMMUNITY
Start: 2019-02-19

## 2019-02-19 RX ORDER — MAGNESIUM HYDROXIDE 400 MG/1
30 TABLET, CHEWABLE ORAL
Qty: 0 | Refills: 0 | COMMUNITY
Start: 2019-02-19

## 2019-02-19 RX ORDER — LACTULOSE 10 G/15ML
20 SOLUTION ORAL
Qty: 0 | Refills: 0 | COMMUNITY

## 2019-02-19 RX ORDER — GABAPENTIN 400 MG/1
600 CAPSULE ORAL
Qty: 0 | Refills: 0 | COMMUNITY

## 2019-02-19 RX ORDER — POLYETHYLENE GLYCOL 3350 17 G/17G
17 POWDER, FOR SOLUTION ORAL
Qty: 0 | Refills: 0 | COMMUNITY

## 2019-02-19 RX ORDER — ONDANSETRON 8 MG/1
0 TABLET, FILM COATED ORAL
Qty: 0 | Refills: 0 | COMMUNITY

## 2019-02-19 RX ORDER — LANOLIN ALCOHOL/MO/W.PET/CERES
6 CREAM (GRAM) TOPICAL
Qty: 0 | Refills: 0 | COMMUNITY

## 2019-02-19 RX ORDER — DOCUSATE SODIUM 100 MG
1 CAPSULE ORAL
Qty: 0 | Refills: 0 | COMMUNITY
Start: 2019-02-19

## 2019-02-19 RX ORDER — ACETAMINOPHEN 500 MG
3 TABLET ORAL
Qty: 0 | Refills: 0 | COMMUNITY
Start: 2019-02-19

## 2019-02-19 RX ORDER — POLYETHYLENE GLYCOL 3350 17 G/17G
17 POWDER, FOR SOLUTION ORAL
Qty: 0 | Refills: 0 | COMMUNITY
Start: 2019-02-19

## 2019-02-19 RX ORDER — OXYCODONE HYDROCHLORIDE 5 MG/1
5 TABLET ORAL
Qty: 0 | Refills: 0 | COMMUNITY

## 2019-02-19 RX ORDER — LANOLIN ALCOHOL/MO/W.PET/CERES
1 CREAM (GRAM) TOPICAL
Qty: 0 | Refills: 0 | COMMUNITY
Start: 2019-02-19

## 2019-02-19 RX ADMIN — Medication 975 MILLIGRAM(S): at 00:23

## 2019-02-19 RX ADMIN — CITALOPRAM 10 MILLIGRAM(S): 10 TABLET, FILM COATED ORAL at 12:02

## 2019-02-19 RX ADMIN — OXYCODONE HYDROCHLORIDE 10 MILLIGRAM(S): 5 TABLET ORAL at 00:20

## 2019-02-19 RX ADMIN — Medication 1: at 12:02

## 2019-02-19 RX ADMIN — Medication 100 MILLIGRAM(S): at 05:38

## 2019-02-19 RX ADMIN — PANTOPRAZOLE SODIUM 40 MILLIGRAM(S): 20 TABLET, DELAYED RELEASE ORAL at 05:38

## 2019-02-19 RX ADMIN — GABAPENTIN 600 MILLIGRAM(S): 400 CAPSULE ORAL at 12:02

## 2019-02-19 RX ADMIN — OXYCODONE HYDROCHLORIDE 10 MILLIGRAM(S): 5 TABLET ORAL at 11:32

## 2019-02-19 RX ADMIN — OXYCODONE HYDROCHLORIDE 10 MILLIGRAM(S): 5 TABLET ORAL at 01:20

## 2019-02-19 RX ADMIN — Medication 75 MICROGRAM(S): at 04:43

## 2019-02-19 RX ADMIN — Medication 1: at 07:34

## 2019-02-19 RX ADMIN — OXYCODONE HYDROCHLORIDE 10 MILLIGRAM(S): 5 TABLET ORAL at 10:36

## 2019-02-19 RX ADMIN — Medication 325 MILLIGRAM(S): at 05:38

## 2019-02-19 RX ADMIN — LOSARTAN POTASSIUM 50 MILLIGRAM(S): 100 TABLET, FILM COATED ORAL at 05:38

## 2019-02-19 NOTE — DISCHARGE NOTE ADULT - MEDICATION SUMMARY - MEDICATIONS TO TAKE
I will START or STAY ON the medications listed below when I get home from the hospital:    acetaminophen 325 mg oral tablet  -- 3 tab(s) by mouth every 4 hours, As needed, Temp greater or equal to 38C (100.4F), Moderate Pain (4 - 6)  -- Indication: For mild pain/fever    oxyCODONE 10 mg oral tablet  -- 1 tab(s) by mouth every 4 hours, As needed, Severe Pain (7 - 10)  -- Indication: For Severe pain     oxyCODONE 5 mg oral tablet  -- 1 tab(s) by mouth every 4 hours, As needed, Moderate Pain (4 - 6)  -- Indication: For moderate pain     aspirin 325 mg oral delayed release tablet  -- 1 tab(s) by mouth 2 times a day x 4 weeks   -- Indication: For Dvt ppx    losartan 50 mg oral tablet  -- 1 tab(s) by mouth 2 times a day  -- Indication: For HTN (hypertension)    aluminum hydroxide-magnesium hydroxide 200 mg-200 mg/5 mL oral suspension  -- 30 milliliter(s) by mouth 4 times a day, As needed, Indigestion  -- Indication: For indigestion    magnesium hydroxide 8% oral suspension  -- 30 milliliter(s) by mouth once a day, As needed, Constipation  -- Indication: For Constipation    gabapentin 300 mg oral capsule  -- 2 cap(s) by mouth once a day  -- Indication: For Home med    citalopram 10 mg oral tablet  -- 1 tab(s) by mouth once a day  -- Indication: For Anxiety and depression    insulin lispro  -- subcutaneous 3 times a day  -- Indication: For DM (diabetes mellitus)    insulin glargine  -- 7 unit(s) subcutaneous once a day  -- Indication: For DM (diabetes mellitus)    atorvastatin 20 mg oral tablet  -- 1 tab(s) by mouth once a day  -- Indication: For HLD (hyperlipidemia)    docusate sodium 100 mg oral capsule  -- 1 cap(s) by mouth 3 times a day  -- Indication: For Laxative    polyethylene glycol 3350 oral powder for reconstitution  -- 17 gram(s) by mouth once a day  -- Indication: For Laxative    senna oral tablet  -- 2 tab(s) by mouth once a day (at bedtime), As needed, Constipation  -- Indication: For Laxative    melatonin 3 mg oral tablet  -- 1 tab(s) by mouth once a day (at bedtime), As needed, Insomnia  -- Indication: For insomnia    omeprazole 20 mg oral delayed release capsule  -- 1 cap(s) by mouth once a day  -- Indication: For reflux     Synthroid 75 mcg (0.075 mg) oral tablet  -- 1 tab(s) by mouth once a day  -- Indication: For Hypothyroidism    Multi Vitamin+  -- 1  by mouth once a day  -- Indication: For Home med    Vitamin D3 1000 intl units oral tablet  -- 1 tab(s) by mouth once a day  -- Indication: For Home med

## 2019-02-19 NOTE — DISCHARGE NOTE ADULT - CARE PROVIDER_API CALL
Holger Sousa)  Orthopaedic Surgery  [address] [place] [address]  Phone: [phone]  Fax: [phone]  Follow Up Time:

## 2019-02-19 NOTE — DISCHARGE NOTE ADULT - HOSPITAL COURSE
Admitted  Surgery  Melisa-op Antibiotics  Pain control  DVT prophylaxis  OOB/Physical Therapy Admitted  Surgery  Melisa-op Antibiotics  Pain control  DVT prophylaxis  OOB/Physical Therapy   medicine/pysch consult

## 2019-02-19 NOTE — DISCHARGE NOTE ADULT - PLAN OF CARE
Improvement in pain and ambulation after surgery No strenuous activity, heavy lifting, driving or returning to work until cleared by MD.  You may shower - dressing is water-resistant, no soaking in bathtubs.  Remove dressing after post op day 5-7, then leave incision open to air. Keep incision clean and dry.  Try to have regular bowel movements, take stool softener or laxative if necessary.  May take Pepcid or Zantac for upset stomach.  May take Aleve or Naproxen instead of Meloxicam.  Swelling may travel all the way down leg to foot, this is normal and will subside in a few weeks.  Call to schedule an appt with Dr. Sousa for follow up, if you have staples or sutures they will be removed in office.  Contact your doctor if you experience: fever greater than 101.5, chills, chest pain, difficulty breathing, redness or excessive drainage around the incision, other concerns. No strenuous activity, heavy lifting, driving or returning to work until cleared by MD.  You may shower - dressing is water-resistant, no soaking in bathtubs.  Remove dressing after 7 days, then leave incision open to air. Keep incision clean and dry.  Try to have regular bowel movements, take stool softener or laxative if necessary.  May take Pepcid or Zantac for upset stomach.  Swelling may travel all the way down leg to foot, this is normal and will subside in a few weeks.  Call to schedule an appt with Dr. Sousa for follow up,  you have staples they will be removed in office.  Contact your doctor if you experience: fever greater than 101.5, chills, chest pain, difficulty breathing, redness or excessive drainage around the incision, other concerns.

## 2019-02-19 NOTE — DISCHARGE NOTE ADULT - MEDICATION SUMMARY - MEDICATIONS TO STOP TAKING
I will STOP taking the medications listed below when I get home from the hospital:    Melatonin  -- 6 milligram(s) by mouth once a day (at bedtime), As Needed

## 2019-02-19 NOTE — DISCHARGE NOTE ADULT - PATIENT PORTAL LINK FT
You can access the [facility] Patient Portal, offered by [facility], by registering with the following website: [email or web address]

## 2019-02-19 NOTE — PROGRESS NOTE ADULT - SUBJECTIVE AND OBJECTIVE BOX
Orthopedics              SUBJECTIVE: Patient seen and examined. Pain controlled overnight. Denies any sob/cp/n/v/numbness or tingling in b/l les.   Plan for SCOUT today    OBJECTIVE:     Vital Signs Last 24 Hrs  T(C): 37 (19 Feb 2019 05:04), Max: 37.1 (18 Feb 2019 08:42)  T(F): 98.6 (19 Feb 2019 05:04), Max: 98.7 (18 Feb 2019 08:42)  HR: 68 (19 Feb 2019 05:04) (57 - 71)  BP: 161/69 (19 Feb 2019 05:04) (127/68 - 161/69)  BP(mean): --  RR: 17 (19 Feb 2019 05:04) (17 - 18)  SpO2: 96% (19 Feb 2019 05:04) (94% - 99%)    Affected extremity:  left le          Dressing: clean/dry/intact         Sensation: intact to light touch to patient's baseline         Motor exam: EHL/TA/GS 5/5  Pulses 2+ b/l les             ASSESSMENT AND PLAN: 77yo Female s/p REVISION LEFT TKR     1. Analgesic pain control  2. DVT prophylaxis: ASA       3. Weight Bearing Status:  Weight bearing as tolerated         4. Disposition: Subacute Rehab likely after Monday, Dr. Sousa would like to keep patient inhouse for a few more days 2/2 to knee dislocated in rehab  5. Depression- continue psych medications Orthopedics              SUBJECTIVE: Patient seen and examined. Pain controlled overnight. Denies any sob/cp/n/v/numbness or tingling in b/l les.   Plan for SCOUT today    OBJECTIVE:     Vital Signs Last 24 Hrs  T(C): 37 (19 Feb 2019 05:04), Max: 37.1 (18 Feb 2019 08:42)  T(F): 98.6 (19 Feb 2019 05:04), Max: 98.7 (18 Feb 2019 08:42)  HR: 68 (19 Feb 2019 05:04) (57 - 71)  BP: 161/69 (19 Feb 2019 05:04) (127/68 - 161/69)  BP(mean): --  RR: 17 (19 Feb 2019 05:04) (17 - 18)  SpO2: 96% (19 Feb 2019 05:04) (94% - 99%)    Affected extremity:  left le          Dressing: clean/dry/intact         Sensation: intact to light touch to patient's baseline         Motor exam: EHL/TA/GS 5/5  Pulses 2+ b/l les             ASSESSMENT AND PLAN: 79yo Female s/p REVISION LEFT TKR     1. Analgesic pain control  2. DVT prophylaxis: ASA       3. Weight Bearing Status:  Weight bearing as tolerated         4. Disposition: Subacute Rehab   5. Depression- continue psych medications

## 2019-02-19 NOTE — DISCHARGE NOTE ADULT - CARE PLAN
Principal Discharge DX:	Knee instability, left  Goal:	Improvement in pain and ambulation after surgery  Assessment and plan of treatment:	No strenuous activity, heavy lifting, driving or returning to work until cleared by MD.  You may shower - dressing is water-resistant, no soaking in bathtubs.  Remove dressing after post op day 5-7, then leave incision open to air. Keep incision clean and dry.  Try to have regular bowel movements, take stool softener or laxative if necessary.  May take Pepcid or Zantac for upset stomach.  May take Aleve or Naproxen instead of Meloxicam.  Swelling may travel all the way down leg to foot, this is normal and will subside in a few weeks.  Call to schedule an appt with Dr. Sousa for follow up, if you have staples or sutures they will be removed in office.  Contact your doctor if you experience: fever greater than 101.5, chills, chest pain, difficulty breathing, redness or excessive drainage around the incision, other concerns. Principal Discharge DX:	Knee instability, left  Goal:	Improvement in pain and ambulation after surgery  Assessment and plan of treatment:	No strenuous activity, heavy lifting, driving or returning to work until cleared by MD.  You may shower - dressing is water-resistant, no soaking in bathtubs.  Remove dressing after 7 days, then leave incision open to air. Keep incision clean and dry.  Try to have regular bowel movements, take stool softener or laxative if necessary.  May take Pepcid or Zantac for upset stomach.  Swelling may travel all the way down leg to foot, this is normal and will subside in a few weeks.  Call to schedule an appt with Dr. Sousa for follow up,  you have staples they will be removed in office.  Contact your doctor if you experience: fever greater than 101.5, chills, chest pain, difficulty breathing, redness or excessive drainage around the incision, other concerns.

## 2019-02-20 PROCEDURE — C1776: CPT

## 2019-02-20 PROCEDURE — 87641 MR-STAPH DNA AMP PROBE: CPT

## 2019-02-20 PROCEDURE — C1713: CPT

## 2019-02-20 PROCEDURE — 97161 PT EVAL LOW COMPLEX 20 MIN: CPT

## 2019-02-20 PROCEDURE — 86850 RBC ANTIBODY SCREEN: CPT

## 2019-02-20 PROCEDURE — 97530 THERAPEUTIC ACTIVITIES: CPT

## 2019-02-20 PROCEDURE — 85027 COMPLETE CBC AUTOMATED: CPT

## 2019-02-20 PROCEDURE — 88311 DECALCIFY TISSUE: CPT

## 2019-02-20 PROCEDURE — 36415 COLL VENOUS BLD VENIPUNCTURE: CPT

## 2019-02-20 PROCEDURE — 80048 BASIC METABOLIC PNL TOTAL CA: CPT

## 2019-02-20 PROCEDURE — 73560 X-RAY EXAM OF KNEE 1 OR 2: CPT

## 2019-02-20 PROCEDURE — 86900 BLOOD TYPING SEROLOGIC ABO: CPT

## 2019-02-20 PROCEDURE — 82962 GLUCOSE BLOOD TEST: CPT

## 2019-02-20 PROCEDURE — 86901 BLOOD TYPING SEROLOGIC RH(D): CPT

## 2019-02-20 PROCEDURE — 88305 TISSUE EXAM BY PATHOLOGIST: CPT

## 2019-02-20 PROCEDURE — 97116 GAIT TRAINING THERAPY: CPT

## 2019-02-20 PROCEDURE — 85025 COMPLETE CBC W/AUTO DIFF WBC: CPT

## 2019-02-26 PROCEDURE — 97161 PT EVAL LOW COMPLEX 20 MIN: CPT

## 2019-02-26 PROCEDURE — 73560 X-RAY EXAM OF KNEE 1 OR 2: CPT

## 2019-02-26 PROCEDURE — 82962 GLUCOSE BLOOD TEST: CPT

## 2019-02-26 PROCEDURE — 97530 THERAPEUTIC ACTIVITIES: CPT

## 2019-02-26 PROCEDURE — C1713: CPT

## 2019-02-26 PROCEDURE — 85027 COMPLETE CBC AUTOMATED: CPT

## 2019-02-26 PROCEDURE — 80048 BASIC METABOLIC PNL TOTAL CA: CPT

## 2019-02-26 PROCEDURE — 36415 COLL VENOUS BLD VENIPUNCTURE: CPT

## 2019-02-26 PROCEDURE — C1776: CPT

## 2019-02-28 DIAGNOSIS — F43.23 ADJUSTMENT DISORDER WITH MIXED ANXIETY AND DEPRESSED MOOD: ICD-10-CM

## 2019-02-28 DIAGNOSIS — I10 ESSENTIAL (PRIMARY) HYPERTENSION: ICD-10-CM

## 2019-02-28 DIAGNOSIS — T84.023A INSTABILITY OF INTERNAL LEFT KNEE PROSTHESIS, INITIAL ENCOUNTER: ICD-10-CM

## 2019-02-28 DIAGNOSIS — E66.9 OBESITY, UNSPECIFIED: ICD-10-CM

## 2019-02-28 DIAGNOSIS — G47.00 INSOMNIA, UNSPECIFIED: ICD-10-CM

## 2019-02-28 DIAGNOSIS — R41.89 OTHER SYMPTOMS AND SIGNS INVOLVING COGNITIVE FUNCTIONS AND AWARENESS: ICD-10-CM

## 2019-02-28 DIAGNOSIS — I45.81 LONG QT SYNDROME: ICD-10-CM

## 2019-02-28 DIAGNOSIS — F41.9 ANXIETY DISORDER, UNSPECIFIED: ICD-10-CM

## 2019-02-28 DIAGNOSIS — F32.9 MAJOR DEPRESSIVE DISORDER, SINGLE EPISODE, UNSPECIFIED: ICD-10-CM

## 2019-02-28 DIAGNOSIS — I69.339 MONOPLEGIA OF UPPER LIMB FOLLOWING CEREBRAL INFARCTION AFFECTING UNSPECIFIED SIDE: ICD-10-CM

## 2019-02-28 DIAGNOSIS — E03.9 HYPOTHYROIDISM, UNSPECIFIED: ICD-10-CM

## 2019-02-28 DIAGNOSIS — E11.9 TYPE 2 DIABETES MELLITUS WITHOUT COMPLICATIONS: ICD-10-CM

## 2019-02-28 DIAGNOSIS — E87.1 HYPO-OSMOLALITY AND HYPONATREMIA: ICD-10-CM

## 2019-02-28 DIAGNOSIS — J44.9 CHRONIC OBSTRUCTIVE PULMONARY DISEASE, UNSPECIFIED: ICD-10-CM
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
## 2019-02-28 DIAGNOSIS — E78.5 HYPERLIPIDEMIA, UNSPECIFIED: ICD-10-CM
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
## 2019-02-28 DIAGNOSIS — G25.81 RESTLESS LEGS SYNDROME: ICD-10-CM

## 2019-02-28 DIAGNOSIS — D64.9 ANEMIA, UNSPECIFIED: ICD-10-CM

## 2019-05-06 NOTE — DIETITIAN INITIAL EVALUATION ADULT. - PROBLEM SELECTOR PROBLEM 2
COPD (chronic obstructive pulmonary disease)
no exudate/uvula midline/NO DROOLING/NO STRIDOR/NO TONGUE ELEVATION/THROAT RED

## 2021-10-03 NOTE — PHYSICAL THERAPY INITIAL EVALUATION ADULT - GAIT DEVIATIONS NOTED, PT EVAL
exam & consent pending decreased weight-shifting ability/decreased clary/decreased step length/dec ability to advance RLE, minimal step clearance, L knee buckling when advancing RLE, mod manual assist to advance LLE posteriorly, unsteady, trendelenberg, pain L knee 5/10

## 2022-06-30 NOTE — PHYSICAL THERAPY INITIAL EVALUATION ADULT - ACTIVE RANGE OF MOTION EXAMINATION, REHAB EVAL
Shakeel Garcia,    Attached are your test results.  Your IUD looks like has shifted out of place to where it should be   Would have you make a follow up appointment with Gynecology    Please contact us if you have any questions.    Bonita Mullins, CNP    
Right UE Active ROM was WNL (within normal limits)/RLE Active ROM was WNL (within normal limits)/L knee 0-90, L elbow 120-160

## 2022-10-20 ENCOUNTER — OFFICE VISIT (OUTPATIENT)
Dept: FAMILY MEDICINE CLINIC | Facility: CLINIC | Age: 82
End: 2022-10-20
Payer: MEDICARE

## 2022-10-20 VITALS
HEART RATE: 68 BPM | DIASTOLIC BLOOD PRESSURE: 58 MMHG | WEIGHT: 172 LBS | SYSTOLIC BLOOD PRESSURE: 140 MMHG | BODY MASS INDEX: 28.66 KG/M2 | HEIGHT: 65 IN | OXYGEN SATURATION: 97 %

## 2022-10-20 DIAGNOSIS — J30.1 SEASONAL ALLERGIC RHINITIS DUE TO POLLEN: ICD-10-CM

## 2022-10-20 DIAGNOSIS — C34.92 NON-SMALL CELL LUNG CANCER, LEFT (HCC): ICD-10-CM

## 2022-10-20 DIAGNOSIS — N39.0 FREQUENT UTI: ICD-10-CM

## 2022-10-20 DIAGNOSIS — E03.9 ACQUIRED HYPOTHYROIDISM: ICD-10-CM

## 2022-10-20 DIAGNOSIS — C50.911 MALIGNANT NEOPLASM OF RIGHT FEMALE BREAST, UNSPECIFIED ESTROGEN RECEPTOR STATUS, UNSPECIFIED SITE OF BREAST (HCC): ICD-10-CM

## 2022-10-20 DIAGNOSIS — E78.2 MIXED HYPERLIPIDEMIA: ICD-10-CM

## 2022-10-20 DIAGNOSIS — N39.44 NOCTURNAL ENURESIS: ICD-10-CM

## 2022-10-20 DIAGNOSIS — C34.91 MALIGNANT NEOPLASM OF RIGHT LUNG, UNSPECIFIED PART OF LUNG (HCC): Primary | ICD-10-CM

## 2022-10-20 DIAGNOSIS — I10 PRIMARY HYPERTENSION: ICD-10-CM

## 2022-10-20 DIAGNOSIS — R60.0 EDEMA OF RIGHT LOWER EXTREMITY: ICD-10-CM

## 2022-10-20 DIAGNOSIS — E11.9 TYPE 2 DIABETES MELLITUS TREATED WITH INSULIN (HCC): ICD-10-CM

## 2022-10-20 DIAGNOSIS — I69.359 CVA, OLD, HEMIPARESIS (HCC): ICD-10-CM

## 2022-10-20 DIAGNOSIS — Z79.4 TYPE 2 DIABETES MELLITUS TREATED WITH INSULIN (HCC): ICD-10-CM

## 2022-10-20 PROBLEM — F41.8 DEPRESSION WITH ANXIETY: Status: ACTIVE | Noted: 2022-10-20

## 2022-10-20 PROBLEM — J30.9 ALLERGIC RHINITIS: Status: ACTIVE | Noted: 2022-10-20

## 2022-10-20 PROCEDURE — 99204 OFFICE O/P NEW MOD 45 MIN: CPT | Performed by: FAMILY MEDICINE

## 2022-10-20 RX ORDER — ATORVASTATIN CALCIUM 20 MG/1
20 TABLET, FILM COATED ORAL DAILY
COMMUNITY

## 2022-10-20 RX ORDER — MONTELUKAST SODIUM 10 MG/1
10 TABLET ORAL
COMMUNITY

## 2022-10-20 RX ORDER — ASPIRIN 81 MG/1
81 TABLET, CHEWABLE ORAL DAILY
COMMUNITY

## 2022-10-20 RX ORDER — QUETIAPINE FUMARATE 25 MG/1
25 TABLET, FILM COATED ORAL
COMMUNITY

## 2022-10-20 RX ORDER — OXYBUTYNIN CHLORIDE 5 MG/1
5 TABLET, EXTENDED RELEASE ORAL DAILY
COMMUNITY
End: 2022-10-26 | Stop reason: SDUPTHER

## 2022-10-20 RX ORDER — LEVOTHYROXINE SODIUM 0.07 MG/1
75 TABLET ORAL DAILY
COMMUNITY

## 2022-10-20 NOTE — PROGRESS NOTES
FAMILY PRACTICE OFFICE VISIT    NAME: Kelby Grijalva    AGE: 80 y o  SEX: female  : 1940   MRN: [id number]    DATE: 10/20/2022  TIME: 3:25 PM    Assessment and Plan   1  Malignant neoplasm of right lung, unspecified part of lung ([facility] )        2  Non-small cell lung cancer, left ([facility] )  Refer to onc - locally  [place]  May need pulmonary input (possible BHAVESH - not dx)  Await records  Not using inhalers - has rx for breo but does not use          3  CVA, old, hemiparesis ([facility] )  Left with LUE residual       4  Type 2 diabetes mellitus treated with insulin ([facility] )  Pt monitoring sugars at home  Await records  Has dexcom  May be able to coordinate care with clinical pharmacist vs endo      5  Malignant neoplasm of right female breast, unspecified estrogen receptor status, unspecified site of breast ([facility] )      6  Acquired hypothyroidism  Await labs    7  Nocturnal enuresis  On oxybutynin  Pt prone to UTI's  Last summer was hospitalized  For uti/dehydration/fall          8  Seasonal allergic rhinitis due to pollen  Stable on singulair    9  Mixed hyperlipidemia  Await cc of labs  10  Primary hypertension  Stable with slightly elevated sbp  Will continue to monitor as it is pt's 1st visit  Up to date on colonoscopy  Had flu shot  Had a pneumonia shot  Not sure if had prevnar 20    Patient Instructions   Request records from specialists - mainly most recent office notes  And labs and vaccines     some glucose tabs or gel  Refer to oncology    Return in 3-4 mos    Go for URGENT venous doppler of right leg to rule out DVT        Chief Complaint     Chief Complaint   Patient presents with   • New Patient Visit     New patient       History of Present Illness   Kelby Grijalva is a 80y o -year-old female who presents today for initial visit with dtr  Lives with dtr and son in law  Recently moved from [place]  No records as of yet      In [place] - pt was seen by Oncology  Endocrine  Neuro  opthal - yearly  Was not seeing pulmonary    Currently pt seems stable medically  Review of Systems   Review of Systems   Constitutional: Negative for fever  HENT:        Hearing aids  Wears upper dentures but not lower   Respiratory: Negative for cough, shortness of breath and wheezing  Possible sleep apnea  No acute respiratory complaints     Cardiovascular: Negative for chest pain and palpitations  Gastrointestinal: Negative for abdominal pain  No recent changes in bowels     Endocrine:        Sugars 120-200  Very rare hypoglycemic events  Wears dexcom  Has glucagon  Genitourinary: Negative for dysuria and hematuria  Frequent uti's  Nocturnal enuersis     Psychiatric/Behavioral: Negative for dysphoric mood, self-injury, sleep disturbance and suicidal ideas  The patient is nervous/anxious  Active Problem List     Patient Active Problem List   Diagnosis   • CVA, old, hemiparesis ([facility] )   • Lung cancer ([facility] )   • Breast cancer ([facility] )   • Non-small cell lung cancer, left ([facility] )   • Type 2 diabetes mellitus treated with insulin ([facility] )   • Acquired hypothyroidism   • Nocturnal enuresis   • Allergic rhinitis   • Mixed hyperlipidemia   • Primary hypertension   • Frequent UTI   • Depression with anxiety         Past Medical History:  Past Medical History:   Diagnosis Date   • Breast cancer ([facility] ) 1991    right   • CVA, old, hemiparesis ([facility] ) 2017    left-sided weakness   • Lung cancer ([place] [address] ) 1980   • Non-small cell lung cancer, left ([facility] ) 2015       Past Surgical History:  Past Surgical History:   Procedure Laterality Date   • MASTECTOMY Right 1991   • REPLACEMENT TOTAL KNEE         Family History:  History reviewed  No pertinent family history  Social History:  Social History     Socioeconomic History   • Marital status:       Spouse name: Not on file   • Number of children: Not on file   • Years of education: Not on file   • Highest education level: Not on file   Occupational History   • Not on file   Tobacco Use   • Smoking status: Former Smoker     Types: Cigarettes     Start date:      Quit date:      Years since quittin 8   • Smokeless tobacco: Never Used   Vaping Use   • Vaping Use: Former   • Substances: Nicotine (quit tob use )   Substance and Sexual Activity   • Alcohol use: Not on file   • Drug use: Not on file   • Sexual activity: Not on file   Other Topics Concern   • Not on file   Social History Narrative   • Not on file     Social Determinants of Health     Financial Resource Strain: Not on file   Food Insecurity: Not on file   Transportation Needs: Not on file   Physical Activity: Not on file   Stress: Not on file   Social Connections: Not on file   Intimate Partner Violence: Not on file   Housing Stability: Not on file       Objective     Vitals:    10/20/22 1400   BP: 150/58   Pulse: 68   SpO2: 97%     Wt Readings from Last 3 Encounters:   10/20/22 78 kg (172 lb)       Physical Exam  Vitals and nursing note reviewed  Constitutional:       Comments: Pt seated comfortably in wheelchair  No distress     Eyes:      General: No scleral icterus  Neck:      Vascular: No carotid bruit  Comments: No thyromegaly  Cardiovascular:      Rate and Rhythm: Normal rate and regular rhythm  Pulses: Normal pulses  Heart sounds: Normal heart sounds  No murmur heard  Pulmonary:      Effort: Pulmonary effort is normal  No respiratory distress  Breath sounds: Normal breath sounds  No wheezing, rhonchi or rales  Abdominal:      General: There is no distension  Palpations: Abdomen is soft  There is no mass  Tenderness: There is no abdominal tenderness  There is no guarding or rebound  Comments: Examined while seated  nontender     Musculoskeletal:         General: No tenderness  Cervical back: Neck supple  Right lower leg: Edema present  Left lower leg: No edema        Comments: RLE edema - chronic and unchanged  Denies h/o DVT,   (-) sarai's sign  No calf tenderness     Lymphadenopathy:      Cervical: No cervical adenopathy  Skin:     General: Skin is warm  Coloration: Skin is not jaundiced  Neurological:      Mental Status: She is oriented to person, place, and time  Comments: Left upper ext paralysis post-cva     Psychiatric:         Mood and Affect: Mood normal          Behavior: Behavior normal          Thought Content: Thought content normal          Judgment: Judgment normal       Comments: Very pleasant           Pertinent Laboratory/Diagnostic Studies:  No results found for: GLUCOSE, BUN, CREATININE, CALCIUM, NA, K, CO2, CL  No results found for: ALT, AST, GGT, ALKPHOS, BILITOT    No results found for: WBC, HGB, HCT, MCV, PLT    No results found for: TSH    No results found for: CHOL  No results found for: TRIG  No results found for: HDL  No results found for: LDLCALC  No results found for: HGBA1C    No results found for this or any previous visit  Orders Placed This Encounter   Procedures   • UA w Reflex to Microscopic w Reflex to Culture   • Ambulatory Referral to Hematology / Oncology       ALLERGIES:  No Known Allergies    Current Medications     No current outpatient medications on file  No current facility-administered medications for this visit           Health Maintenance     Health Maintenance   Topic Date Due   • Medicare Annual Wellness Visit (AWV)  Never done   • HEMOGLOBIN A1C  Never done   • COVID-19 Vaccine (1) Never done   • Pneumococcal Vaccine: 65+ Years (1 - PCV) Never done   • Diabetic Foot Exam  Never done   • DM Eye Exam  Never done   • URINE MICROALBUMIN  Never done   • Depression Screening  Never done   • BMI: Followup Plan  Never done   • Osteoporosis Screening  Never done   • Urinary Incontinence Screening  Never done   • Fall Risk  10/20/2023   • BMI: Adult  10/20/2023   • Influenza Vaccine  Completed   • HIB Vaccine  Aged Out   • Hepatitis B Vaccine  Aged Out   • IPV Vaccine  Aged Out   • Hepatitis A Vaccine  Aged Out   • Meningococcal ACWY Vaccine  Aged Out   • HPV Vaccine  Aged Out       There is no immunization history on file for this patient  BMI Counseling: Body mass index is 28 62 kg/m²  The BMI is above normal  Nutrition recommendations include decreasing portion sizes, encouraging healthy choices of fruits and vegetables, decreasing fast food intake, consuming healthier snacks, limiting drinks that contain sugar, moderation in carbohydrate intake, increasing intake of lean protein, reducing intake of saturated and trans fat and reducing intake of cholesterol  Exercise recommendations include exercising 3-5 times per week  No pharmacotherapy was ordered  Rationale for BMI follow-up plan is due to patient being overweight or obese  Advise some exercise - can do chair exercises            Ivy Cowart

## 2022-10-20 NOTE — PATIENT INSTRUCTIONS
Request records from specialists - mainly most recent office notes  And labs and vaccines     some glucose tabs or gel      Refer to oncology    Return in 3-4 mos    Go for URGENT venous doppler of right leg to rule out DVT

## 2022-10-21 ENCOUNTER — TELEPHONE (OUTPATIENT)
Dept: HEMATOLOGY ONCOLOGY | Facility: CLINIC | Age: 82
End: 2022-10-21

## 2022-10-21 NOTE — TELEPHONE ENCOUNTER
Made attempt to schedule a new patient appointment with Hematology oncology, patient daughter advise she will call back Monday to schedule a appointment for the patient

## 2022-10-24 ENCOUNTER — PATIENT OUTREACH (OUTPATIENT)
Dept: HEMATOLOGY ONCOLOGY | Facility: CLINIC | Age: 82
End: 2022-10-24

## 2022-10-24 NOTE — PROGRESS NOTES
Intake received, chart reviewed for need of external records  Pt is transferring her care  Per intake, patient was advised to have records faxed to [phone] for office review

## 2022-10-25 ENCOUNTER — PATIENT OUTREACH (OUTPATIENT)
Dept: HEMATOLOGY ONCOLOGY | Facility: CLINIC | Age: 82
End: 2022-10-25

## 2022-10-25 NOTE — PROGRESS NOTES
Outside records requested from: [facility]    Pathology Requested  [address] Requested  [phone]  Phone:[phone]

## 2022-10-25 NOTE — PROGRESS NOTES
Called and spoke to patient's daughter Tanna Pleitez  She was currently in [place] and will call me back

## 2022-10-26 DIAGNOSIS — R32 URINARY INCONTINENCE, UNSPECIFIED TYPE: Primary | ICD-10-CM

## 2022-10-27 ENCOUNTER — PATIENT OUTREACH (OUTPATIENT)
Dept: HEMATOLOGY ONCOLOGY | Facility: CLINIC | Age: 82
End: 2022-10-27

## 2022-10-27 RX ORDER — OXYBUTYNIN CHLORIDE 5 MG/1
5 TABLET, EXTENDED RELEASE ORAL DAILY
Qty: 30 TABLET | Refills: 3 | Status: SHIPPED | OUTPATIENT
Start: 2022-10-27

## 2022-10-27 NOTE — PROGRESS NOTES
Initial outreach  Called and spoke to patient's daughter Portia Nicolas  Introduced myself and explained the role of a Nurse Navigator  Reviewed upcoming appointments including date, time and location  Portia Nicolas and her family recently moved here from [place]  The patient lives with them  The patient has a remote history of breast cancer treated in [facility] and a history of lung cancer s/p surgery and adjuvant chemotherapy in 2015  Patient was seen by her new PCP to establish care  It was suggested she establish care with a Medical Oncologist for surveillance  Portia Nicolas is in the process of having [facility]  I provided my direct phone number for questions or concerns  She was appreciative

## 2022-11-09 DIAGNOSIS — R32 URINARY INCONTINENCE, UNSPECIFIED TYPE: ICD-10-CM

## 2022-11-09 RX ORDER — OXYBUTYNIN CHLORIDE 5 MG/1
5 TABLET, EXTENDED RELEASE ORAL DAILY
Qty: 90 TABLET | Refills: 2 | Status: SHIPPED | OUTPATIENT
Start: 2022-11-09

## 2022-11-11 ENCOUNTER — DOCUMENTATION (OUTPATIENT)
Dept: HEMATOLOGY ONCOLOGY | Facility: CLINIC | Age: 82
End: 2022-11-11

## 2022-11-11 NOTE — PROGRESS NOTES
Records received from outside facility  Outside Images records received from : [facility]     Records sent to Radiology attention Reading Room    Note routed to team

## 2022-11-16 ENCOUNTER — HOSPITAL ENCOUNTER (OUTPATIENT)
Dept: RADIOLOGY | Facility: HOSPITAL | Age: 82
Discharge: HOME | End: 2022-11-16
Attending: RADIOLOGY

## 2022-11-16 DIAGNOSIS — Z76.89 REFERRAL OF PATIENT WITHOUT EXAMINATION OR TREATMENT: ICD-10-CM

## 2022-11-17 ENCOUNTER — TELEPHONE (OUTPATIENT)
Dept: HEMATOLOGY ONCOLOGY | Facility: CLINIC | Age: 82
End: 2022-11-17

## 2022-11-17 NOTE — TELEPHONE ENCOUNTER
lvm for pt in regards to imaging disc, imaging upload into pt's chart  Called Nicholas to see if pt would like to have her disc sent back to her  Left hope line's phone number for pt to return my call

## 2022-11-28 DIAGNOSIS — R32 URINARY INCONTINENCE, UNSPECIFIED TYPE: ICD-10-CM

## 2022-11-29 ENCOUNTER — TELEPHONE (OUTPATIENT)
Dept: HEMATOLOGY ONCOLOGY | Facility: CLINIC | Age: 82
End: 2022-11-29

## 2022-11-29 RX ORDER — QUETIAPINE FUMARATE 25 MG/1
25 TABLET, FILM COATED ORAL
OUTPATIENT
Start: 2022-11-29

## 2022-11-29 RX ORDER — MONTELUKAST SODIUM 10 MG/1
10 TABLET ORAL
OUTPATIENT
Start: 2022-11-29

## 2022-11-29 RX ORDER — OXYBUTYNIN CHLORIDE 5 MG/1
5 TABLET, EXTENDED RELEASE ORAL DAILY
Qty: 90 TABLET | Refills: 2 | OUTPATIENT
Start: 2022-11-29

## 2022-11-29 NOTE — TELEPHONE ENCOUNTER
Spoke with pt's daughter Zora Koch who requested to cancel today's appt, as they would like to keep all her mother's care at [facility]  She has my direct line if she wishes to reschedule

## 2022-11-29 NOTE — TELEPHONE ENCOUNTER
Left voicemail for pt's daughter requesting appt be moved to [phone] today  Requested call back and provided my direct line

## 2022-12-01 DIAGNOSIS — N39.44 NOCTURNAL ENURESIS: ICD-10-CM

## 2022-12-01 DIAGNOSIS — F41.8 DEPRESSION WITH ANXIETY: ICD-10-CM

## 2022-12-01 DIAGNOSIS — J30.1 SEASONAL ALLERGIC RHINITIS DUE TO POLLEN: Primary | ICD-10-CM

## 2022-12-01 DIAGNOSIS — R32 URINARY INCONTINENCE, UNSPECIFIED TYPE: ICD-10-CM

## 2022-12-01 RX ORDER — OXYBUTYNIN CHLORIDE 5 MG/1
5 TABLET, EXTENDED RELEASE ORAL DAILY
Qty: 30 TABLET | Refills: 0 | Status: SHIPPED | OUTPATIENT
Start: 2022-12-01

## 2022-12-01 RX ORDER — MONTELUKAST SODIUM 10 MG/1
10 TABLET ORAL
Qty: 30 TABLET | Refills: 0 | Status: SHIPPED | OUTPATIENT
Start: 2022-12-01

## 2022-12-01 RX ORDER — QUETIAPINE FUMARATE 25 MG/1
25 TABLET, FILM COATED ORAL
Qty: 30 TABLET | Refills: 0 | Status: SHIPPED | OUTPATIENT
Start: 2022-12-01

## 2022-12-01 NOTE — TELEPHONE ENCOUNTER
pts daughter called in   They have NOT found a new pcp    They are leaving out of state today and she needs her medications sent to the pharmacy

## 2022-12-05 ENCOUNTER — TELEPHONE (OUTPATIENT)
Dept: FAMILY MEDICINE CLINIC | Facility: CLINIC | Age: 82
End: 2022-12-05

## 2022-12-05 DIAGNOSIS — I10 PRIMARY HYPERTENSION: Primary | ICD-10-CM

## 2022-12-05 RX ORDER — AMLODIPINE BESYLATE 10 MG/1
10 TABLET ORAL DAILY
COMMUNITY
End: 2022-12-05 | Stop reason: SDUPTHER

## 2022-12-05 RX ORDER — AMLODIPINE BESYLATE 10 MG/1
10 TABLET ORAL DAILY
Qty: 14 TABLET | Refills: 0 | Status: SHIPPED | OUTPATIENT
Start: 2022-12-05

## 2022-12-05 NOTE — TELEPHONE ENCOUNTER
pts daughter called in stating that she is also on amlodipine 10mg  She runs out Wednesday   She wants to know if this is ok until she sees her new doctor next week or if you can refill enough until she sees her new pcp  ?

## 2023-01-26 ENCOUNTER — TELEPHONE (OUTPATIENT)
Dept: HEMATOLOGY ONCOLOGY | Facility: CLINIC | Age: 83
End: 2023-01-26

## 2023-01-26 NOTE — TELEPHONE ENCOUNTER
As per Isatu's note       Spoke with pt's daughter Carly Moreno who requested to cancel today's appt, as they would like to keep all her mother's care at [facility]  She has my direct line if she wishes to reschedule

## 2023-02-19 NOTE — PROGRESS NOTE ADULT - VASCULAR
Equal and normal pulses (carotid, femoral, dorsalis pedis)
Equal and normal pulses (carotid, femoral, dorsalis pedis)
Statement Selected

## 2023-09-25 ENCOUNTER — TELEPHONE (OUTPATIENT)
Dept: FAMILY MEDICINE CLINIC | Facility: CLINIC | Age: 83
End: 2023-09-25

## 2023-09-25 NOTE — TELEPHONE ENCOUNTER
On 12/12/2022 patient established care with Cinthya Acosta MD    [address] [place] [address]   [phone] Rishabh Hardy   [phone] (Fax)

## 2023-09-27 NOTE — TELEPHONE ENCOUNTER
09/27/23 12:21 PM        The office's request has been received, reviewed, and the patient chart updated. The PCP has successfully been removed with a patient attribution note. This message will now be completed.         Thank you  Sudhir Humphreys

## 2024-04-08 NOTE — PATIENT PROFILE ADULT - NSPROHMDIABETBLDGLCTARGET_GEN_A_NUR
Onset: 04/06/
Regarding: wi f 89- severe lower abdominal pain  ----- Message from Mikayla Chapa sent at 4/8/2024 10:42 AM CDT -----  Patient Name: Rashid Kaba    Specialist or PCP Name: sandy galdamez    Symptoms: severe lower abdominal pain    Pregnant (females aged 13-60. If Yes, how long?) : favio    Call Back # : [phone]    Which State are you currently located in?: [place]    Name of Clinic Site / Acct# : [id number]    
known

## 2025-03-03 NOTE — DIETITIAN INITIAL EVALUATION ADULT. - SIGNS/SYMPTOMS
ANTICOAGULATION     Melvin Abad is overdue for an INR check.     [facility] Per chart review, patient INRs are being dosed by in house provider. Will postpone one  week.     Neeta Cheung, RN  3/3/2025  Anticoagulation Clinic  [facility] for routing messages: cristi COLON  [facility] patient phone line: [phone]  
AEB s/p revision of L TKR